# Patient Record
Sex: MALE | Race: WHITE | NOT HISPANIC OR LATINO | Employment: FULL TIME | URBAN - METROPOLITAN AREA
[De-identification: names, ages, dates, MRNs, and addresses within clinical notes are randomized per-mention and may not be internally consistent; named-entity substitution may affect disease eponyms.]

---

## 2017-01-20 ENCOUNTER — TELEPHONE (OUTPATIENT)
Dept: FAMILY MEDICINE | Facility: CLINIC | Age: 32
End: 2017-01-20

## 2017-01-20 DIAGNOSIS — D68.61 ANTI-PHOSPHOLIPID ANTIBODY SYNDROME (H): Primary | ICD-10-CM

## 2017-01-20 DIAGNOSIS — Z79.01 LONG TERM (CURRENT) USE OF ANTICOAGULANTS: ICD-10-CM

## 2017-01-20 RX ORDER — WARFARIN SODIUM 5 MG/1
TABLET ORAL
Qty: 180 TABLET | Refills: 0 | Status: SHIPPED | OUTPATIENT
Start: 2017-01-20 | End: 2017-02-20

## 2017-01-20 NOTE — TELEPHONE ENCOUNTER
Ok to re-enroll in INR clinic. rx refilled. Please have patient follow up with me as well in clinic.    Vahid Rush MD

## 2017-01-20 NOTE — TELEPHONE ENCOUNTER
Patient contacted [facility] to request a refill of Warfarin.  Patient was removed from active [facility] patient list due to non-compliance.  Patient's last INR check was 04/08/2016.  Patient reports that he has been off of his Warfarin for approximately 6 months due to personal stressors in his life.  Last office visit with primary provider over 2 years ago on 10/22/2014.    Advised that the above information would be sent to primary provider to please review and advise.    If provider would like patient to be re-enrolled in INR clinic please place INR Clinic referral and approve pended order for Warfarin.    Please advise on Warfarin dosing (last known Warfarin dose was 10 mg daily) due to patient having not taken Warfarin for the past 6 months.    Routing to provider to please review and advise.  Isaura Tang RN

## 2017-01-20 NOTE — TELEPHONE ENCOUNTER
Spoke with patient to inform of below information.  INR appointment scheduled for 01/27/2017 (1 week from today) at 8:00 AM; schedule office visit with Dr. Rush on 01/27/2017 at 8:20 AM.  Advised patient to resume previous maintenance dose of Warfarin 10 mg daily.    Routing to provider as FYI and to advise with any additional concerns.  Isaura Tang RN

## 2017-01-27 ENCOUNTER — ANTICOAGULATION THERAPY VISIT (OUTPATIENT)
Dept: NURSING | Facility: CLINIC | Age: 32
End: 2017-01-27
Payer: COMMERCIAL

## 2017-01-27 ENCOUNTER — OFFICE VISIT (OUTPATIENT)
Dept: FAMILY MEDICINE | Facility: CLINIC | Age: 32
End: 2017-01-27
Payer: COMMERCIAL

## 2017-01-27 VITALS
HEART RATE: 59 BPM | TEMPERATURE: 97.5 F | OXYGEN SATURATION: 96 % | SYSTOLIC BLOOD PRESSURE: 115 MMHG | BODY MASS INDEX: 34.63 KG/M2 | HEIGHT: 74 IN | DIASTOLIC BLOOD PRESSURE: 68 MMHG | WEIGHT: 269.8 LBS

## 2017-01-27 DIAGNOSIS — I82.409 DVT (DEEP VENOUS THROMBOSIS) (H): ICD-10-CM

## 2017-01-27 DIAGNOSIS — D68.61 ANTI-PHOSPHOLIPID ANTIBODY SYNDROME (H): ICD-10-CM

## 2017-01-27 DIAGNOSIS — D68.61 ANTI-PHOSPHOLIPID ANTIBODY SYNDROME (H): Primary | ICD-10-CM

## 2017-01-27 DIAGNOSIS — Z79.01 LONG-TERM (CURRENT) USE OF ANTICOAGULANTS: ICD-10-CM

## 2017-01-27 DIAGNOSIS — Z79.01 LONG-TERM (CURRENT) USE OF ANTICOAGULANTS: Primary | ICD-10-CM

## 2017-01-27 LAB — INR POINT OF CARE: 1.1 (ref 0.86–1.14)

## 2017-01-27 PROCEDURE — 99213 OFFICE O/P EST LOW 20 MIN: CPT | Performed by: FAMILY MEDICINE

## 2017-01-27 PROCEDURE — 85610 PROTHROMBIN TIME: CPT | Mod: QW

## 2017-01-27 PROCEDURE — 36416 COLLJ CAPILLARY BLOOD SPEC: CPT

## 2017-01-27 PROCEDURE — 99207 ZZC NO CHARGE NURSE ONLY: CPT

## 2017-01-27 ASSESSMENT — PAIN SCALES - GENERAL: PAINLEVEL: NO PAIN (0)

## 2017-01-27 NOTE — MR AVS SNAPSHOT
After Visit Summary   1/27/2017    Jorge Vuong    MRN: [id number]           Patient Information     Date Of Birth          1985        Visit Information        Provider Department      1/27/2017 8:20 AM Vahid Rush MD [facility]        Today's Diagnoses     Anti-phospholipid antibody syndrome (H)    -  1     Long-term (current) use of anticoagulants [Z79.01]           Care Instructions    How to contact your care team providers:    [facility]  [phone]  Pharmacy [phone]    ARON Carpio Dr., Dr., Dr., PA-C    Team RN: Van PRUITT    Clinic hours  M-Th 7am-7pm   Fri 7am-5pm.   Urgent care M-F 11am-9pm,   Sat/sun 9am-5pm.  Pharmacy M-F 8:00am-8pm Sat/sun 9am-5pm.     All password changes, disabled accounts, or ID changes in [employer]/MyHealth will be done by our Access Services Department.   If you need help with your account or password, call: [phone]. Clinic staff no longer has the ability to change passwords.                       Follow-ups after your visit        Your next 10 appointments already scheduled     Feb 01, 2017  8:40 AM   [facility]    [address] [place] [id number] [phone]              Who to contact     If you have questions or need follow up information about today's clinic visit or your schedule please contact [facility] directly at [phone].  Normal or non-critical lab and imaging results will be communicated to you by MyChart, letter or phone within 4 business days after the clinic has received the results. If you do not hear from us within 7 days, please contact the clinic through MyChart or phone. If you have a critical or abnormal lab result, we will notify you by phone as soon as  "possible.  Submit refill requests through [email or web address] or call your pharmacy and they will forward the refill request to us. Please allow 3 business days for your refill to be completed.          Additional Information About Your Visit        [employer] To Media Information     [email or web address] lets you send messages to your doctor, view your test results, renew your prescriptions, schedule appointments and more. To sign up, go to [email or web address] . Click on \"Log in\" on the left side of the screen, which will take you to the Welcome page. Then click on \"Sign up Now\" on the right side of the page.     You will be asked to enter the access code listed below, as well as some personal information. Please follow the directions to create your username and password.     Your access code is: [email or web address]  Expires: 2017  8:28 AM     Your access code will  in 90 days. If you need help or a new code, please call your [facility] clinic or [phone].        Care EveryWhere ID     This is your Care EveryWhere ID. This could be used by other organizations to access your [facility] medical records  [id number]        Your Vitals Were     Pulse Temperature Height BMI (Body Mass Index) Pulse Oximetry       59 97.5  F (36.4  C) (Oral) 6' 2\" (1.88 m) 34.63 kg/m2 96%        Blood Pressure from Last 3 Encounters:   17 115/68   16 134/69   10/22/14 143/78    Weight from Last 3 Encounters:   17 269 lb 12.8 oz (122.38 kg)   16 253 lb 9.6 oz (115.032 kg)   10/22/14 268 lb 3.2 oz (121.655 kg)              Today, you had the following     No orders found for display       Primary Care Provider Office Phone # Fax #    Vahid Rush [phone]       [facility] [address]  [facility] [address]        Thank you!     Thank you for choosing [facility]  for your care. Our goal is always to provide you with excellent care. Hearing back from our patients is one way we can continue " to improve our services. Please take a few minutes to complete the written survey that you may receive in the mail after your visit with us. Thank you!             Your Updated Medication List - Protect others around you: Learn how to safely use, store and throw away your medicines at www.disposemymeds.org.          This list is accurate as of: 1/27/17  8:28 AM.  Always use your most recent med list.                   Brand Name Dispense Instructions for use    warfarin 5 MG tablet    COUMADIN    180 tablet    Take two tablets daily or as directed by [facility] nurse

## 2017-01-27 NOTE — PROGRESS NOTES
ANTICOAGULATION FOLLOW-UP CLINIC VISIT    Patient Name:  Jorge Vuong  Date:  1/27/2017  Contact Type:  Face to Face    SUBJECTIVE:     Patient Findings     Positives Initiation of therapy, Missed doses    Comments Recently restarted Warfarin on 01/20/2017 (please review telephone encounter from 01/20/2017 - patient has not taken Warfarin in approximately six months).  Patient missed Warfarin 10 mg on 01/21/2017 and 01/25/2017.  Recommended that patient get a pill bronson with day of the week on it to help aid in medication compliance.  The soonest patient could return to clinic for INR check was on 0201/2017.           OBJECTIVE    INR PROTIME   Date Value Ref Range Status   01/27/2017 1.1 0.86 - 1.14 Final     FACTOR 2 ASSAY   Date Value Ref Range Status   10/07/2009 25* 60 - 140 % Final       ASSESSMENT / PLAN  INR assessment SUB    Recheck INR In: 5 DAYS    INR Location Clinic      Anticoagulation Summary as of 1/27/2017     INR goal 2.0-3.0   Selected INR 1.1! (1/27/2017)   Maintenance plan 10 mg (5 mg x 2) every day   Full instructions 1/27: 15 mg; Otherwise 10 mg every day   Weekly total 70 mg   Plan last modified Isaura Tang RN (4/8/2016)   Next INR check 2/1/2017   Priority Factor 2   Target end date     Indications   Long-term (current) use of anticoagulants [Z79.01] [Z79.01]  Anti-phospholipid antibody syndrome (H) [D68.61]  DVT (deep venous thrombosis) (H) [I82.409]         Anticoagulation Episode Summary     INR check location     Preferred lab     Send INR reminders to [facility]    Comments       Anticoagulation Care Providers     Provider Role Specialty Phone number    Vahid Rush MD [facility] [phone]            See the Encounter Report to view Anticoagulation Flowsheet and Dosing Calendar (Go to Encounters tab in chart review, and find the Anticoagulation Therapy Visit)    Isaura Tang RN

## 2017-01-27 NOTE — MR AVS SNAPSHOT
Jorge JACOBSON Carolann   1/27/2017 8:00 AM   Anticoagulation Therapy Visit    Description:  31 year old male   Provider:  СЕРГЕЙ JACOBSON   Department:  [facility]           INR as of 1/27/2017     Selected INR 1.1! (1/27/2017)      Anticoagulation Summary as of 1/27/2017     INR goal 2.0-3.0   Selected INR 1.1! (1/27/2017)   Full instructions 1/27: 15 mg; Otherwise 10 mg every day   Next INR check 2/1/2017    Indications   Long-term (current) use of anticoagulants [Z79.01] [Z79.01]  Anti-phospholipid antibody syndrome (H) [D68.61]  DVT (deep venous thrombosis) (H) [I82.409]         Your next Anticoagulation Clinic appointment(s)     Feb 01, 2017  8:40 AM   Anticoagulation Visit with СЕРГЕЙ JACOBSON   [facility]    [address] [place] [id number] [phone]              Contact Numbers     [facility]  Please call  [phone] to cancel and/or reschedule your appointment, or with any problems or questions regarding your therapy.        January 2017 Details    Sun Mon Tue Wed Thu Fri Sat     1               2               3               4               5               6               7                 8               9               10               11               12               13               14                 15               16               17               18               19               20               21                 22               23               24               25               26               27      15 mg   See details      28      10 mg           29      10 mg         30      10 mg         31      10 mg              Date Details   01/27 This INR check               How to take your warfarin dose     To take:  10 mg Take 2 of the 5 mg tablets.    To take:  15 mg Take 3 of the 5 mg tablets.           February 2017 Details    Sun Mon Tue Wed Thu Fri Sat        1            2               3               4                  5               6               7               8               9               10               11                 12               13               14               15               16               17               18                 19               20               21               22               23               24               25                 26               27               28                    Date Details   No additional details    Date of next INR:  2/1/2017         How to take your warfarin dose     To take:  10 mg Take 2 of the 5 mg tablets.

## 2017-01-27 NOTE — NURSING NOTE
"Chief Complaint   Patient presents with     Recheck Medication     follow up coumadin       Initial /68 mmHg  Pulse 59  Temp(Src) 97.5  F (36.4  C) (Oral)  Ht 6' 2\" (1.88 m)  Wt 269 lb 12.8 oz (122.38 kg)  BMI 34.63 kg/m2  SpO2 96% Estimated body mass index is 34.63 kg/(m^2) as calculated from the following:    Height as of this encounter: 6' 2\" (1.88 m).    Weight as of this encounter: 269 lb 12.8 oz (122.38 kg).  BP completed using cuff size: leny Nickerson MA      "

## 2017-01-27 NOTE — PATIENT INSTRUCTIONS
How to contact your care team providers:    [facility]  [phone]  Pharmacy [phone]    ARON Carpio Dr., Dr., Dr., PA-C    Team RN: Van PRUITT    Clinic hours  M-Th 7am-7pm   Fri 7am-5pm.   Urgent care M-F 11am-9pm,   Sat/sun 9am-5pm.  Pharmacy M-F 8:00am-8pm Sat/sun 9am-5pm.     All password changes, disabled accounts, or ID changes in [employer]MyHealth will be done by our Access Services Department.   If you need help with your account or password, call: [phone]. Clinic staff no longer has the ability to change passwords.

## 2017-01-27 NOTE — PROGRESS NOTES
"  SUBJECTIVE:                                                    Jorge Vuong is a 31 year old male who presents to clinic today for the following health issues:      Medication Followup of Coumadin    Taking Medication as prescribed: yes    Side Effects:  None    Medication Helping Symptoms:  yes       Problem list and histories reviewed & adjusted, as indicated.  Additional history: as documented    Problem list, Medication list, Allergies, and Medical/Social/Surgical histories reviewed in EPIC and updated as appropriate.    ROS:  Constitutional, HEENT, cardiovascular, pulmonary, GI, , musculoskeletal, neuro, skin, endocrine and psych systems are negative, except as otherwise noted.    OBJECTIVE:                                                    /68 mmHg  Pulse 59  Temp(Src) 97.5  F (36.4  C) (Oral)  Ht 6' 2\" (1.88 m)  Wt 269 lb 12.8 oz (122.38 kg)  BMI 34.63 kg/m2  SpO2 96%  Body mass index is 34.63 kg/(m^2).  GENERAL: healthy, alert and no distress  NECK: no adenopathy, no asymmetry, masses, or scars and thyroid normal to palpation  RESP: lungs clear to auscultation - no rales, rhonchi or wheezes  CV: regular rate and rhythm, normal S1 S2, no S3 or S4, no murmur, click or rub, no peripheral edema and peripheral pulses strong  ABDOMEN: soft, nontender, no hepatosplenomegaly, no masses and bowel sounds normal  MS: no gross musculoskeletal defects noted, no edema    Diagnostic Test Results:  none      ASSESSMENT/PLAN:                                                      1. Anti-phospholipid antibody syndrome (H)  Will need anticoagulation indefinitely. Discuss importance of compliance to decrease risk for thrombosis or bleeding. Patient understands and agrees with plan.    2. Long-term (current) use of anticoagulants [Z79.01]  As above.      See Patient Instructions    Vahid Rush MD, MD  [facility]    "

## 2017-02-01 ENCOUNTER — ANTICOAGULATION THERAPY VISIT (OUTPATIENT)
Dept: NURSING | Facility: CLINIC | Age: 32
End: 2017-02-01
Payer: COMMERCIAL

## 2017-02-01 DIAGNOSIS — Z79.01 LONG-TERM (CURRENT) USE OF ANTICOAGULANTS: Primary | ICD-10-CM

## 2017-02-01 DIAGNOSIS — D68.61 ANTI-PHOSPHOLIPID ANTIBODY SYNDROME (H): ICD-10-CM

## 2017-02-01 DIAGNOSIS — I82.409 DVT (DEEP VENOUS THROMBOSIS) (H): ICD-10-CM

## 2017-02-01 LAB — INR POINT OF CARE: 2.3 (ref 0.86–1.14)

## 2017-02-01 PROCEDURE — 36416 COLLJ CAPILLARY BLOOD SPEC: CPT

## 2017-02-01 PROCEDURE — 85610 PROTHROMBIN TIME: CPT | Mod: QW

## 2017-02-01 PROCEDURE — 99207 ZZC NO CHARGE NURSE ONLY: CPT

## 2017-02-01 NOTE — MR AVS SNAPSHOT
Jorge A Carolann   2/1/2017 8:40 AM   Anticoagulation Therapy Visit    Description:  31 year old male   Provider:  СЕРГЕЙ JACOBSON   Department:  [facility]           INR as of 2/1/2017     Selected INR 2.3 (2/1/2017)      Anticoagulation Summary as of 2/1/2017     INR goal 2.0-3.0   Selected INR 2.3 (2/1/2017)   Full instructions 10 mg every day   Next INR check 2/6/2017    Indications   Long-term (current) use of anticoagulants [Z79.01] [Z79.01]  Anti-phospholipid antibody syndrome (H) [D68.61]  DVT (deep venous thrombosis) (H) [I82.409]         Your next Anticoagulation Clinic appointment(s)     Feb 06, 2017  9:00 AM   Anticoagulation Visit with СЕРГЕЙ JACOBSON   [facility] ([facility])    [address] [place] [id number] [phone]              Contact Numbers     [facility]  Please call  [phone] to cancel and/or reschedule your appointment, or with any problems or questions regarding your therapy.        February 2017 Details    Sun Mon Tue Wed Thu Fri Sat        1      10 mg   See details      2      10 mg         3      10 mg         4      10 mg           5      10 mg         6            7               8               9               10               11                 12               13               14               15               16               17               18                 19               20               21               22               23               24               25                 26               27               28                    Date Details   02/01 This INR check       Date of next INR:  2/6/2017         How to take your warfarin dose     To take:  10 mg Take 2 of the 5 mg tablets.

## 2017-02-01 NOTE — PROGRESS NOTES
ANTICOAGULATION FOLLOW-UP CLINIC VISIT    Patient Name:  Jorge Vuong  Date:  2/1/2017  Contact Type:  Face to Face    SUBJECTIVE:     Patient Findings     Positives No Problem Findings           OBJECTIVE    INR PROTIME   Date Value Ref Range Status   02/01/2017 2.3* 0.86 - 1.14 Final     FACTOR 2 ASSAY   Date Value Ref Range Status   10/07/2009 25* 60 - 140 % Final       ASSESSMENT / PLAN  INR assessment THER    Recheck INR In: 5 DAYS    INR Location Clinic      Anticoagulation Summary as of 2/1/2017     INR goal 2.0-3.0   Selected INR 2.3 (2/1/2017)   Maintenance plan 10 mg (5 mg x 2) every day   Full instructions 10 mg every day   Weekly total 70 mg   No change documented Isaura Tang RN   Plan last modified Isaura Tang RN (4/8/2016)   Next INR check 2/6/2017   Priority Factor 2   Target end date     Indications   Long-term (current) use of anticoagulants [Z79.01] [Z79.01]  Anti-phospholipid antibody syndrome (H) [D68.61]  DVT (deep venous thrombosis) (H) [I82.409]         Anticoagulation Episode Summary     INR check location     Preferred lab     Send INR reminders to  [facility]    Comments       Anticoagulation Care Providers     Provider Role Specialty Phone number    Vahid Rush MD [facility] [phone]            See the Encounter Report to view Anticoagulation Flowsheet and Dosing Calendar (Go to Encounters tab in chart review, and find the Anticoagulation Therapy Visit)    Isaura Tang RN

## 2017-02-06 ENCOUNTER — ANTICOAGULATION THERAPY VISIT (OUTPATIENT)
Dept: NURSING | Facility: CLINIC | Age: 32
End: 2017-02-06
Payer: COMMERCIAL

## 2017-02-06 DIAGNOSIS — D68.61 ANTI-PHOSPHOLIPID ANTIBODY SYNDROME (H): ICD-10-CM

## 2017-02-06 DIAGNOSIS — Z79.01 LONG-TERM (CURRENT) USE OF ANTICOAGULANTS: Primary | ICD-10-CM

## 2017-02-06 DIAGNOSIS — I82.409 DVT (DEEP VENOUS THROMBOSIS) (H): ICD-10-CM

## 2017-02-06 LAB — INR POINT OF CARE: 3 (ref 0.86–1.14)

## 2017-02-06 PROCEDURE — 36416 COLLJ CAPILLARY BLOOD SPEC: CPT

## 2017-02-06 PROCEDURE — 85610 PROTHROMBIN TIME: CPT | Mod: QW

## 2017-02-06 PROCEDURE — 99207 ZZC NO CHARGE NURSE ONLY: CPT

## 2017-02-06 NOTE — MR AVS SNAPSHOT
Jorge JACOBSON Carolann   2/6/2017 9:00 AM   Anticoagulation Therapy Visit    Description:  31 year old male   Provider:  СЕРГЕЙ JACOBSON   Department:  [facility]           INR as of 2/6/2017     Selected INR 3.0 (2/6/2017)      Anticoagulation Summary as of 2/6/2017     INR goal 2.0-3.0   Selected INR 3.0 (2/6/2017)   Full instructions 10 mg every day   Next INR check 2/13/2017    Indications   Long-term (current) use of anticoagulants [Z79.01] [Z79.01]  Anti-phospholipid antibody syndrome (H) [D68.61]  DVT (deep venous thrombosis) (H) [I82.409]         Your next Anticoagulation Clinic appointment(s)     Feb 06, 2017  9:00 AM   Anticoagulation Visit with СЕРГЕЙ JACOBSON   [facility]    [address] [facility] [id number] [phone]            Feb 13, 2017  1:40 PM   Anticoagulation Visit with СЕРГЕЙ JACOBSON   [facility] ([facility]    [address] [facility] [address]   [phone]              Contact Numbers     [facility]  Please call  [phone] to cancel and/or reschedule your appointment, or with any problems or questions regarding your therapy.        February 2017 Details    Sun Mon Tue Wed Thu Fri Sat        1               2               3               4                 5               6      10 mg   See details      7      10 mg         8      10 mg         9      10 mg         10      10 mg         11      10 mg           12      10 mg         13            14               15               16               17               18                 19               20               21               22               23               24               25                 26               27               28                    Date Details   02/06 This INR check       Date of next INR:  2/13/2017         How to take your warfarin dose     To take:  10 mg Take 2 of the 5 mg tablets.

## 2017-02-06 NOTE — PROGRESS NOTES
ANTICOAGULATION FOLLOW-UP CLINIC VISIT    Patient Name:  Jorge Vuong  Date:  2/6/2017  Contact Type:  Face to Face    SUBJECTIVE:     Patient Findings     Positives No Problem Findings           OBJECTIVE    INR PROTIME   Date Value Ref Range Status   02/06/2017 3.0* 0.86 - 1.14 Final     FACTOR 2 ASSAY   Date Value Ref Range Status   10/07/2009 25* 60 - 140 % Final       ASSESSMENT / PLAN  INR assessment THER    Recheck INR In: 1 WEEK    INR Location Clinic      Anticoagulation Summary as of 2/6/2017     INR goal 2.0-3.0   Selected INR 3.0 (2/6/2017)   Maintenance plan 10 mg (5 mg x 2) every day   Full instructions 10 mg every day   Weekly total 70 mg   No change documented Isaura Tang RN   Plan last modified Isaura Tang RN (4/8/2016)   Next INR check 2/13/2017   Priority Factor 2   Target end date     Indications   Long-term (current) use of anticoagulants [Z79.01] [Z79.01]  Anti-phospholipid antibody syndrome (H) [D68.61]  DVT (deep venous thrombosis) (H) [I82.409]         Anticoagulation Episode Summary     INR check location     Preferred lab     Send INR reminders to  [facility]    Comments       Anticoagulation Care Providers     Provider Role Specialty Phone number    Vahid Rush MD [facility] [phone]            See the Encounter Report to view Anticoagulation Flowsheet and Dosing Calendar (Go to Encounters tab in chart review, and find the Anticoagulation Therapy Visit)    Isaura Tang RN

## 2017-02-13 ENCOUNTER — ANTICOAGULATION THERAPY VISIT (OUTPATIENT)
Dept: NURSING | Facility: CLINIC | Age: 32
End: 2017-02-13
Payer: COMMERCIAL

## 2017-02-13 DIAGNOSIS — D68.61 ANTI-PHOSPHOLIPID ANTIBODY SYNDROME (H): ICD-10-CM

## 2017-02-13 DIAGNOSIS — I82.409 DVT (DEEP VENOUS THROMBOSIS) (H): ICD-10-CM

## 2017-02-13 DIAGNOSIS — Z79.01 LONG-TERM (CURRENT) USE OF ANTICOAGULANTS: ICD-10-CM

## 2017-02-13 LAB — INR POINT OF CARE: 3.5 (ref 0.86–1.14)

## 2017-02-13 PROCEDURE — 85610 PROTHROMBIN TIME: CPT | Mod: QW

## 2017-02-13 PROCEDURE — 36416 COLLJ CAPILLARY BLOOD SPEC: CPT

## 2017-02-13 PROCEDURE — 99207 ZZC NO CHARGE NURSE ONLY: CPT

## 2017-02-13 NOTE — MR AVS SNAPSHOT
Jorge Lynayana   2/13/2017 1:40 PM   Anticoagulation Therapy Visit    Description:  31 year old male   Provider:  СЕРГЕЙ JACOBSON   Department:  [facility]           INR as of 2/13/2017     Today's INR 3.5!      Anticoagulation Summary as of 2/13/2017     INR goal 2.0-3.0   Today's INR 3.5!   Full instructions 2/13: 5 mg; Otherwise 10 mg every day   Next INR check 2/20/2017    Indications   Long-term (current) use of anticoagulants [Z79.01] [Z79.01]  Anti-phospholipid antibody syndrome (H) [D68.61]  DVT (deep venous thrombosis) (H) [I82.409]         Your next Anticoagulation Clinic appointment(s)     Feb 20, 2017  9:00 AM CST   Anticoagulation Visit with СЕРГЕЙ JACOBSON   [facility]    [address] [place] [id number] [phone]              Contact Numbers     [facility]  Please call  [phone] to cancel and/or reschedule your appointment, or with any problems or questions regarding your therapy.        February 2017 Details    Sun Mon Tue Wed Thu Fri Sat        1               2               3               4                 5               6               7               8               9               10               11                 12               13      5 mg   See details      14      10 mg         15      10 mg         16      10 mg         17      10 mg         18      10 mg           19      10 mg         20            21               22               23               24               25                 26               27               28                    Date Details   02/13 This INR check       Date of next INR:  2/20/2017         How to take your warfarin dose     To take:  5 mg Take 1 of the 5 mg tablets.    To take:  10 mg Take 2 of the 5 mg tablets.

## 2017-02-13 NOTE — PROGRESS NOTES
ANTICOAGULATION FOLLOW-UP CLINIC VISIT    Patient Name:  Jorge Vuong  Date:  2/13/2017  Contact Type:  Face to Face    SUBJECTIVE:     Patient Findings     Positives No Problem Findings    Comments Patient does not eat as many green vegetables as he used to when he took Warfarin several months ago (back when maintenance dose was 70 mg of Warfarin per week).           OBJECTIVE    INR Protime   Date Value Ref Range Status   02/13/2017 3.5 (A) 0.86 - 1.14 Final     Factor 2 Assay   Date Value Ref Range Status   10/07/2009 25 (L) 60 - 140 % Final       ASSESSMENT / PLAN  INR assessment SUPRA    Recheck INR In: 1 WEEK    INR Location Clinic      Anticoagulation Summary as of 2/13/2017     INR goal 2.0-3.0   Today's INR 3.5!   Maintenance plan 10 mg (5 mg x 2) every day   Full instructions 2/13: 5 mg; Otherwise 10 mg every day   Weekly total 70 mg   Plan last modified Isaura Tang RN (4/8/2016)   Next INR check 2/20/2017   Priority Factor 2   Target end date     Indications   Long-term (current) use of anticoagulants [Z79.01] [Z79.01]  Anti-phospholipid antibody syndrome (H) [D68.61]  DVT (deep venous thrombosis) (H) [I82.409]         Anticoagulation Episode Summary     INR check location     Preferred lab     Send INR reminders to [facility]    Comments       Anticoagulation Care Providers     Provider Role Specialty Phone number    Vahid Rush MD [facility] [phone]            See the Encounter Report to view Anticoagulation Flowsheet and Dosing Calendar (Go to Encounters tab in chart review, and find the Anticoagulation Therapy Visit)    Isaura Tang RN

## 2017-02-20 ENCOUNTER — ANTICOAGULATION THERAPY VISIT (OUTPATIENT)
Dept: NURSING | Facility: CLINIC | Age: 32
End: 2017-02-20
Payer: COMMERCIAL

## 2017-02-20 DIAGNOSIS — I82.409 DVT (DEEP VENOUS THROMBOSIS) (H): ICD-10-CM

## 2017-02-20 DIAGNOSIS — D68.61 ANTI-PHOSPHOLIPID ANTIBODY SYNDROME (H): ICD-10-CM

## 2017-02-20 DIAGNOSIS — Z79.01 LONG TERM (CURRENT) USE OF ANTICOAGULANTS: ICD-10-CM

## 2017-02-20 DIAGNOSIS — Z79.01 LONG-TERM (CURRENT) USE OF ANTICOAGULANTS: ICD-10-CM

## 2017-02-20 LAB — INR POINT OF CARE: 1.8 (ref 0.86–1.14)

## 2017-02-20 PROCEDURE — 85610 PROTHROMBIN TIME: CPT | Mod: QW

## 2017-02-20 PROCEDURE — 99207 ZZC NO CHARGE NURSE ONLY: CPT

## 2017-02-20 PROCEDURE — 36416 COLLJ CAPILLARY BLOOD SPEC: CPT

## 2017-02-20 RX ORDER — WARFARIN SODIUM 5 MG/1
TABLET ORAL
Qty: 180 TABLET | Refills: 0 | COMMUNITY
Start: 2017-02-20 | End: 2017-04-03

## 2017-02-20 NOTE — MR AVS SNAPSHOT
Jorge JACOBSON Carolann   2/20/2017 9:00 AM   Anticoagulation Therapy Visit    Description:  31 year old male   Provider:  СЕРГЕЙ JACOBSON   Department:  [facility]           INR as of 2/20/2017     Today's INR 1.8!      Anticoagulation Summary as of 2/20/2017     INR goal 2.0-3.0   Today's INR 1.8!   Full instructions 7.5 mg on Mon, Fri; 10 mg all other days   Next INR check 2/27/2017    Indications   Long-term (current) use of anticoagulants [Z79.01] [Z79.01]  Anti-phospholipid antibody syndrome (H) [D68.61]  DVT (deep venous thrombosis) (H) [I82.409]         Your next Anticoagulation Clinic appointment(s)     Feb 20, 2017  9:00 AM CST   Anticoagulation Visit with СЕРГЕЙ JACOBSON   [facility] ([facility])    [address] [place] [id number] [phone]              Contact Numbers     [facility]  Please call  [phone] to cancel and/or reschedule your appointment, or with any problems or questions regarding your therapy.        February 2017 Details    Sun Mon Tue Wed Thu Fri Sat        1               2               3               4                 5               6               7               8               9               10               11                 12               13               14               15               16               17               18                 19               20      7.5 mg   See details      21      10 mg         22      10 mg         23      10 mg         24      7.5 mg         25      10 mg           26      10 mg         27            28                    Date Details   02/20 This INR check       Date of next INR:  2/27/2017         How to take your warfarin dose     To take:  7.5 mg Take 1.5 of the 5 mg tablets.    To take:  10 mg Take 2 of the 5 mg tablets.

## 2017-02-20 NOTE — PROGRESS NOTES
ANTICOAGULATION FOLLOW-UP CLINIC VISIT    Patient Name:  Jorge Vuong  Date:  2/20/2017  Contact Type:  Face to Face    SUBJECTIVE:     Patient Findings     Positives Missed doses    Comments Patient missed 10 mg dose of Warfarin on 02/15/2017.           OBJECTIVE    INR Protime   Date Value Ref Range Status   02/20/2017 1.8 (A) 0.86 - 1.14 Final     Factor 2 Assay   Date Value Ref Range Status   10/07/2009 25 (L) 60 - 140 % Final       ASSESSMENT / PLAN  INR assessment SUB    Recheck INR In: 1 WEEK    INR Location Clinic      Anticoagulation Summary as of 2/20/2017     INR goal 2.0-3.0   Today's INR 1.8!   Maintenance plan 7.5 mg (5 mg x 1.5) on Mon, Fri; 10 mg (5 mg x 2) all other days   Full instructions 7.5 mg on Mon, Fri; 10 mg all other days   Weekly total 65 mg   Plan last modified Isaura Tang RN (2/20/2017)   Next INR check 2/27/2017   Priority Factor 2   Target end date     Indications   Long-term (current) use of anticoagulants [Z79.01] [Z79.01]  Anti-phospholipid antibody syndrome (H) [D68.61]  DVT (deep venous thrombosis) (H) [I82.409]         Anticoagulation Episode Summary     INR check location     Preferred lab     Send INR reminders to [facility]    Comments       Anticoagulation Care Providers     Provider Role Specialty Phone number    Vahid Rush MD [facility] [phone]            See the Encounter Report to view Anticoagulation Flowsheet and Dosing Calendar (Go to Encounters tab in chart review, and find the Anticoagulation Therapy Visit)    Isaura Tang RN

## 2017-02-27 ENCOUNTER — ANTICOAGULATION THERAPY VISIT (OUTPATIENT)
Dept: NURSING | Facility: CLINIC | Age: 32
End: 2017-02-27
Payer: COMMERCIAL

## 2017-02-27 DIAGNOSIS — Z79.01 LONG-TERM (CURRENT) USE OF ANTICOAGULANTS: ICD-10-CM

## 2017-02-27 DIAGNOSIS — D68.61 ANTI-PHOSPHOLIPID ANTIBODY SYNDROME (H): ICD-10-CM

## 2017-02-27 DIAGNOSIS — I82.409 DVT (DEEP VENOUS THROMBOSIS) (H): ICD-10-CM

## 2017-02-27 LAB — INR POINT OF CARE: 2.4 (ref 0.86–1.14)

## 2017-02-27 PROCEDURE — 85610 PROTHROMBIN TIME: CPT | Mod: QW

## 2017-02-27 PROCEDURE — 99207 ZZC NO CHARGE NURSE ONLY: CPT

## 2017-02-27 PROCEDURE — 36416 COLLJ CAPILLARY BLOOD SPEC: CPT

## 2017-02-27 NOTE — PROGRESS NOTES
ANTICOAGULATION FOLLOW-UP CLINIC VISIT    Patient Name:  Joreg Vuong  Date:  2/27/2017  Contact Type:  Face to Face    SUBJECTIVE:     Patient Findings     Positives No Problem Findings           OBJECTIVE    INR Protime   Date Value Ref Range Status   02/27/2017 2.4 (A) 0.86 - 1.14 Final     Factor 2 Assay   Date Value Ref Range Status   10/07/2009 25 (L) 60 - 140 % Final       ASSESSMENT / PLAN  INR assessment THER    Recheck INR In: 2 WEEKS    INR Location Clinic      Anticoagulation Summary as of 2/27/2017     INR goal 2.0-3.0   Today's INR 2.4   Maintenance plan 7.5 mg (5 mg x 1.5) on Mon, Fri; 10 mg (5 mg x 2) all other days   Full instructions 7.5 mg on Mon, Fri; 10 mg all other days   Weekly total 65 mg   No change documented Isaura Tang RN   Plan last modified Isaura Tang RN (2/20/2017)   Next INR check 3/13/2017   Priority Factor 2   Target end date     Indications   Long-term (current) use of anticoagulants [Z79.01] [Z79.01]  Anti-phospholipid antibody syndrome (H) [D68.61]  DVT (deep venous thrombosis) (H) [I82.409]         Anticoagulation Episode Summary     INR check location     Preferred lab     Send INR reminders to [facility]    Comments       Anticoagulation Care Providers     Provider Role Specialty Phone number    Vahid Rush MD [facility] [phone]            See the Encounter Report to view Anticoagulation Flowsheet and Dosing Calendar (Go to Encounters tab in chart review, and find the Anticoagulation Therapy Visit)    Isaura Tang RN

## 2017-02-27 NOTE — MR AVS SNAPSHOT
Jorge Lynayana   2/27/2017 8:40 AM   Anticoagulation Therapy Visit    Description:  31 year old male   Provider:  СЕРГЕЙ JACOBSON   Department:  [facility]           INR as of 2/27/2017     Today's INR 2.4      Anticoagulation Summary as of 2/27/2017     INR goal 2.0-3.0   Today's INR 2.4   Full instructions 7.5 mg on Mon, Fri; 10 mg all other days   Next INR check 3/13/2017    Indications   Long-term (current) use of anticoagulants [Z79.01] [Z79.01]  Anti-phospholipid antibody syndrome (H) [D68.61]  DVT (deep venous thrombosis) (H) [I82.409]         Your next Anticoagulation Clinic appointment(s)     Feb 27, 2017  8:40 AM CST   Anticoagulation Visit with СЕРГЕЙ JACOBSON   [facility] ([facility])    [address] [facility] [address]   [phone]            Mar 13, 2017  9:20 AM CDT   Anticoagulation Visit with СЕРГЕЙ JACOBSON   [facility] ([facility])    [address] [facility] [address]   [phone]              Contact Numbers     [facility]  Please call  [phone] to cancel and/or reschedule your appointment, or with any problems or questions regarding your therapy.        February 2017 Details    Sun Mon Tue Wed Thu Fri Sat        1               2               3               4                 5               6               7               8               9               10               11                 12               13               14               15               16               17               18                 19               20               21               22               23               24               25                 26               27      7.5 mg   See details      28      10 mg              Date Details   02/27 This INR check               How to take your warfarin dose     To take:  7.5 mg Take 1.5 of the 5 mg tablets.    To take:  10 mg Take 2 of the 5 mg  tablets.           March 2017 Details    Sun Mon Tue Wed Thu Fri Sat        1      10 mg         2      10 mg         3      7.5 mg         4      10 mg           5      10 mg         6      7.5 mg         7      10 mg         8      10 mg         9      10 mg         10      7.5 mg         11      10 mg           12      10 mg         13            14               15               16               17               18                 19               20               21               22               23               24               25                 26               27               28               29               30               31                 Date Details   No additional details    Date of next INR:  3/13/2017         How to take your warfarin dose     To take:  7.5 mg Take 1.5 of the 5 mg tablets.    To take:  10 mg Take 2 of the 5 mg tablets.

## 2017-03-13 ENCOUNTER — ANTICOAGULATION THERAPY VISIT (OUTPATIENT)
Dept: NURSING | Facility: CLINIC | Age: 32
End: 2017-03-13
Payer: COMMERCIAL

## 2017-03-13 DIAGNOSIS — Z79.01 LONG-TERM (CURRENT) USE OF ANTICOAGULANTS: ICD-10-CM

## 2017-03-13 DIAGNOSIS — I82.409 DVT (DEEP VENOUS THROMBOSIS) (H): ICD-10-CM

## 2017-03-13 DIAGNOSIS — D68.61 ANTI-PHOSPHOLIPID ANTIBODY SYNDROME (H): ICD-10-CM

## 2017-03-13 LAB — INR POINT OF CARE: 1.5 (ref 0.86–1.14)

## 2017-03-13 PROCEDURE — 99207 ZZC NO CHARGE NURSE ONLY: CPT

## 2017-03-13 PROCEDURE — 85610 PROTHROMBIN TIME: CPT | Mod: QW

## 2017-03-13 PROCEDURE — 36416 COLLJ CAPILLARY BLOOD SPEC: CPT

## 2017-03-13 NOTE — MR AVS SNAPSHOT
Jorge JACOBSON Carolann   3/13/2017 9:20 AM   Anticoagulation Therapy Visit    Description:  31 year old male   Provider:  СЕРГЕЙ JACOBSON   Department:  [facility]           INR as of 3/13/2017     Today's INR 1.5!      Anticoagulation Summary as of 3/13/2017     INR goal 2.0-3.0   Today's INR 1.5!   Full instructions 3/13: 10 mg; Otherwise 7.5 mg on Mon, Fri; 10 mg all other days   Next INR check 3/20/2017    Indications   Long-term (current) use of anticoagulants [Z79.01] [Z79.01]  Anti-phospholipid antibody syndrome (H) [D68.61]  DVT (deep venous thrombosis) (H) [I82.409]         Your next Anticoagulation Clinic appointment(s)     Mar 20, 2017  8:40 AM CDT   Anticoagulation Visit with СЕРГЕЙ JACOBSON   [facility]    [address] [place] [id number] [phone]              Contact Numbers     [facility]  Please call  [phone] to cancel and/or reschedule your appointment, or with any problems or questions regarding your therapy.        March 2017 Details    Sun Mon Tue Wed Thu Fri Sat        1               2               3               4                 5               6               7               8               9               10               11                 12               13      10 mg   See details      14      10 mg         15      10 mg         16      10 mg         17      7.5 mg         18      10 mg           19      10 mg         20            21               22               23               24               25                 26               27               28               29               30               31                 Date Details   03/13 This INR check       Date of next INR:  3/20/2017         How to take your warfarin dose     To take:  7.5 mg Take 1.5 of the 5 mg tablets.    To take:  10 mg Take 2 of the 5 mg tablets.

## 2017-03-13 NOTE — PROGRESS NOTES
ANTICOAGULATION FOLLOW-UP CLINIC VISIT    Patient Name:  Jorge Vuong  Date:  3/13/2017  Contact Type:  Face to Face    SUBJECTIVE:     Patient Findings     Positives Missed doses    Comments Patient is unsure if he missed a dose of Warfarin in the past 7 days.  Per patient report, potentially missed dose on Friday (03/10/2017).           OBJECTIVE    INR Protime   Date Value Ref Range Status   03/13/2017 1.5 (A) 0.86 - 1.14 Final     Factor 2 Assay   Date Value Ref Range Status   10/07/2009 25 (L) 60 - 140 % Final       ASSESSMENT / PLAN  INR assessment SUB    Recheck INR In: 1 WEEK    INR Location Clinic      Anticoagulation Summary as of 3/13/2017     INR goal 2.0-3.0   Today's INR 1.5!   Maintenance plan 7.5 mg (5 mg x 1.5) on Mon, Fri; 10 mg (5 mg x 2) all other days   Full instructions 3/13: 10 mg; Otherwise 7.5 mg on Mon, Fri; 10 mg all other days   Weekly total 65 mg   Plan last modified Isaura Tang RN (2/20/2017)   Next INR check 3/20/2017   Priority Factor 2   Target end date     Indications   Long-term (current) use of anticoagulants [Z79.01] [Z79.01]  Anti-phospholipid antibody syndrome (H) [D68.61]  DVT (deep venous thrombosis) (H) [I82.409]         Anticoagulation Episode Summary     INR check location     Preferred lab     Send INR reminders to [facility]    Comments       Anticoagulation Care Providers     Provider Role Specialty Phone number    Vahid Rush MD [facility] [phone]            See the Encounter Report to view Anticoagulation Flowsheet and Dosing Calendar (Go to Encounters tab in chart review, and find the Anticoagulation Therapy Visit)    Isaura Tang RN

## 2017-03-22 ENCOUNTER — ANTICOAGULATION THERAPY VISIT (OUTPATIENT)
Dept: NURSING | Facility: CLINIC | Age: 32
End: 2017-03-22
Payer: COMMERCIAL

## 2017-03-22 DIAGNOSIS — Z79.01 LONG-TERM (CURRENT) USE OF ANTICOAGULANTS: ICD-10-CM

## 2017-03-22 DIAGNOSIS — I82.409 DVT (DEEP VENOUS THROMBOSIS) (H): ICD-10-CM

## 2017-03-22 DIAGNOSIS — D68.61 ANTI-PHOSPHOLIPID ANTIBODY SYNDROME (H): ICD-10-CM

## 2017-03-22 LAB — INR POINT OF CARE: 2.2 (ref 0.86–1.14)

## 2017-03-22 PROCEDURE — 99207 ZZC NO CHARGE NURSE ONLY: CPT

## 2017-03-22 PROCEDURE — 85610 PROTHROMBIN TIME: CPT | Mod: QW

## 2017-03-22 PROCEDURE — 36416 COLLJ CAPILLARY BLOOD SPEC: CPT

## 2017-03-22 NOTE — PROGRESS NOTES
ANTICOAGULATION FOLLOW-UP CLINIC VISIT    Patient Name:  Jorge Vuong  Date:  3/22/2017  Contact Type:  Face to Face    SUBJECTIVE:     Patient Findings     Positives No Problem Findings    Comments Patient took Warfarin 10 mg on Friday instead of recommended 7.5 mg.             OBJECTIVE    INR Protime   Date Value Ref Range Status   03/22/2017 2.2 (A) 0.86 - 1.14 Final     Factor 2 Assay   Date Value Ref Range Status   10/07/2009 25 (L) 60 - 140 % Final       ASSESSMENT / PLAN  INR assessment THER    Recheck INR In: 10 DAYS    INR Location Clinic      Anticoagulation Summary as of 3/22/2017     INR goal 2.0-3.0   Today's INR 2.2   Maintenance plan 7.5 mg (5 mg x 1.5) on Mon; 10 mg (5 mg x 2) all other days   Full instructions 7.5 mg on Mon; 10 mg all other days   Weekly total 67.5 mg   Plan last modified Isaura Tang RN (3/22/2017)   Next INR check 4/3/2017   Priority Factor 2   Target end date     Indications   Long-term (current) use of anticoagulants [Z79.01] [Z79.01]  Anti-phospholipid antibody syndrome (H) [D68.61]  DVT (deep venous thrombosis) (H) [I82.409]         Anticoagulation Episode Summary     INR check location     Preferred lab     Send INR reminders to [facility]    Comments       Anticoagulation Care Providers     Provider Role Specialty Phone number    Vaihd Rush MD [facility] [phone]            See the Encounter Report to view Anticoagulation Flowsheet and Dosing Calendar (Go to Encounters tab in chart review, and find the Anticoagulation Therapy Visit)    Isaura Tang RN

## 2017-03-22 NOTE — MR AVS SNAPSHOT
Jorge Lynayana   3/22/2017 4:00 PM   Anticoagulation Therapy Visit    Description:  31 year old male   Provider:  СЕРГЕЙ JACOBSON   Department:  [facility]           INR as of 3/22/2017     Today's INR 2.2      Anticoagulation Summary as of 3/22/2017     INR goal 2.0-3.0   Today's INR 2.2   Full instructions 7.5 mg on Mon; 10 mg all other days   Next INR check 4/3/2017    Indications   Long-term (current) use of anticoagulants [Z79.01] [Z79.01]  Anti-phospholipid antibody syndrome (H) [D68.61]  DVT (deep venous thrombosis) (H) [I82.409]         Your next Anticoagulation Clinic appointment(s)     Apr 03, 2017  9:20 AM CDT   Anticoagulation Visit with СЕРГЕЙ JACOBSON   [facility]    [address] [place] [id number] [phone]              Contact Numbers     [facility]  Please call  [phone] to cancel and/or reschedule your appointment, or with any problems or questions regarding your therapy.        March 2017 Details    Sun Mon Tue Wed Thu Fri Sat        1               2               3               4                 5               6               7               8               9               10               11                 12               13               14               15               16               17               18                 19               20               21               22      10 mg   See details      23      10 mg         24      10 mg         25      10 mg           26      10 mg         27      7.5 mg         28      10 mg         29      10 mg         30      10 mg         31      10 mg           Date Details   03/22 This INR check               How to take your warfarin dose     To take:  7.5 mg Take 1.5 of the 5 mg tablets.    To take:  10 mg Take 2 of the 5 mg tablets.           April 2017 Details    Sun Mon Tue Wed Thu Fri Sat           1      10 mg           2      10 mg         3             4               5               6               7               8                 9               10               11               12               13               14               15                 16               17               18               19               20               21               22                 23               24               25               26               27               28               29                 30                      Date Details   No additional details    Date of next INR:  4/3/2017         How to take your warfarin dose     To take:  7.5 mg Take 1.5 of the 5 mg tablets.    To take:  10 mg Take 2 of the 5 mg tablets.

## 2017-04-03 ENCOUNTER — ANTICOAGULATION THERAPY VISIT (OUTPATIENT)
Dept: NURSING | Facility: CLINIC | Age: 32
End: 2017-04-03
Payer: COMMERCIAL

## 2017-04-03 DIAGNOSIS — Z79.01 LONG TERM (CURRENT) USE OF ANTICOAGULANTS: ICD-10-CM

## 2017-04-03 DIAGNOSIS — Z79.01 LONG-TERM (CURRENT) USE OF ANTICOAGULANTS: ICD-10-CM

## 2017-04-03 DIAGNOSIS — I82.409 DVT (DEEP VENOUS THROMBOSIS) (H): ICD-10-CM

## 2017-04-03 DIAGNOSIS — D68.61 ANTI-PHOSPHOLIPID ANTIBODY SYNDROME (H): ICD-10-CM

## 2017-04-03 LAB — INR POINT OF CARE: 2.3 (ref 0.86–1.14)

## 2017-04-03 PROCEDURE — 85610 PROTHROMBIN TIME: CPT | Mod: QW

## 2017-04-03 PROCEDURE — 36416 COLLJ CAPILLARY BLOOD SPEC: CPT

## 2017-04-03 PROCEDURE — 99207 ZZC NO CHARGE NURSE ONLY: CPT

## 2017-04-03 RX ORDER — WARFARIN SODIUM 5 MG/1
TABLET ORAL
Qty: 180 TABLET | Refills: 0 | COMMUNITY
Start: 2017-04-03 | End: 2017-11-15

## 2017-04-03 NOTE — PROGRESS NOTES
ANTICOAGULATION FOLLOW-UP CLINIC VISIT    Patient Name:  Jorge Vuong  Date:  4/3/2017  Contact Type:  Face to Face    SUBJECTIVE:     Patient Findings     Positives No Problem Findings           OBJECTIVE    INR Protime   Date Value Ref Range Status   04/03/2017 2.3 (A) 0.86 - 1.14 Final     Factor 2 Assay   Date Value Ref Range Status   10/07/2009 25 (L) 60 - 140 % Final       ASSESSMENT / PLAN  INR assessment THER    Recheck INR In: 3 WEEKS    INR Location Clinic      Anticoagulation Summary as of 4/3/2017     INR goal 2.0-3.0   Today's INR 2.3   Maintenance plan 7.5 mg (5 mg x 1.5) on Mon; 10 mg (5 mg x 2) all other days   Full instructions 7.5 mg on Mon; 10 mg all other days   Weekly total 67.5 mg   No change documented Isaura Tang RN   Plan last modified Isaura Tang RN (3/22/2017)   Next INR check 4/24/2017   Priority Factor 2   Target end date     Indications   Long-term (current) use of anticoagulants [Z79.01] [Z79.01]  Anti-phospholipid antibody syndrome (H) [D68.61]  DVT (deep venous thrombosis) (H) [I82.409]         Anticoagulation Episode Summary     INR check location     Preferred lab     Send INR reminders to [facility]    Comments       Anticoagulation Care Providers     Provider Role Specialty Phone number    Vahid Rush MD [facility] [phone]            See the Encounter Report to view Anticoagulation Flowsheet and Dosing Calendar (Go to Encounters tab in chart review, and find the Anticoagulation Therapy Visit)    Isaura Tang RN

## 2017-04-03 NOTE — MR AVS SNAPSHOT
Jorge A Carolann   4/3/2017 9:20 AM   Anticoagulation Therapy Visit    Description:  31 year old male   Provider:  СЕРГЕЙ JACOBSON   Department:  [facility]           INR as of 4/3/2017     Today's INR 2.3      Anticoagulation Summary as of 4/3/2017     INR goal 2.0-3.0   Today's INR 2.3   Full instructions 7.5 mg on Mon; 10 mg all other days   Next INR check 4/24/2017    Indications   Long-term (current) use of anticoagulants [Z79.01] [Z79.01]  Anti-phospholipid antibody syndrome (H) [D68.61]  DVT (deep venous thrombosis) (H) [I82.409]         Your next Anticoagulation Clinic appointment(s)     Apr 03, 2017  9:20 AM CDT   Anticoagulation Visit with СЕРГЕЙ JACOBSON   [facility] ([facility])    [address] [facility] [id number] [phone]            Apr 24, 2017  8:40 AM CDT   Anticoagulation Visit with СЕРГЕЙ JACOBSON   [facility] ([facility]    [address] [facility] [id number] [phone]              Contact Numbers     [facility]  Please call  [phone] to cancel and/or reschedule your appointment, or with any problems or questions regarding your therapy.        April 2017 Details    Sun Mon Tue Wed Thu Fri Sat           1                 2               3      7.5 mg   See details      4      10 mg         5      10 mg         6      10 mg         7      10 mg         8      10 mg           9      10 mg         10      7.5 mg         11      10 mg         12      10 mg         13      10 mg         14      10 mg         15      10 mg           16      10 mg         17      7.5 mg         18      10 mg         19      10 mg         20      10 mg         21      10 mg         22      10 mg           23      10 mg         24            25               26               27               28               29                 30                      Date Details   04/03 This INR check        Date of next INR:  4/24/2017         How to take your warfarin dose     To take:  7.5 mg Take 1.5 of the 5 mg tablets.    To take:  10 mg Take 2 of the 5 mg tablets.

## 2017-04-24 ENCOUNTER — ANTICOAGULATION THERAPY VISIT (OUTPATIENT)
Dept: NURSING | Facility: CLINIC | Age: 32
End: 2017-04-24
Payer: COMMERCIAL

## 2017-04-24 DIAGNOSIS — D68.61 ANTI-PHOSPHOLIPID ANTIBODY SYNDROME (H): ICD-10-CM

## 2017-04-24 DIAGNOSIS — I82.409 DVT (DEEP VENOUS THROMBOSIS) (H): ICD-10-CM

## 2017-04-24 DIAGNOSIS — Z79.01 LONG-TERM (CURRENT) USE OF ANTICOAGULANTS: ICD-10-CM

## 2017-04-24 LAB — INR POINT OF CARE: 2.2 (ref 0.86–1.14)

## 2017-04-24 PROCEDURE — 99207 ZZC NO CHARGE NURSE ONLY: CPT

## 2017-04-24 PROCEDURE — 85610 PROTHROMBIN TIME: CPT | Mod: QW

## 2017-04-24 PROCEDURE — 36416 COLLJ CAPILLARY BLOOD SPEC: CPT

## 2017-04-24 NOTE — MR AVS SNAPSHOT
Jorge JACOBSON Carolann   4/24/2017 8:40 AM   Anticoagulation Therapy Visit    Description:  31 year old male   Provider:  СЕРГЕЙ JACOBSON   Department:  [facility]           INR as of 4/24/2017     Today's INR 2.2      Anticoagulation Summary as of 4/24/2017     INR goal 2.0-3.0   Today's INR 2.2   Full instructions 7.5 mg on Mon; 10 mg all other days   Next INR check 5/22/2017    Indications   Long-term (current) use of anticoagulants [Z79.01] [Z79.01]  Anti-phospholipid antibody syndrome (H) [D68.61]  DVT (deep venous thrombosis) (H) [I82.409]         Your next Anticoagulation Clinic appointment(s)     Apr 24, 2017  8:40 AM CDT   Anticoagulation Visit with СЕРГЕЙ JACOBSON   [facility] ([facility])    [address] [facility] [id number] [phone]            May 22, 2017  8:40 AM CDT   Anticoagulation Visit with СЕРГЕЙ JACOBSON   [facility] ([facility]    [address] [address] [facility] [address] [facility] [id number] [phone]              Contact Numbers     [facility]  Please call  [phone] to cancel and/or reschedule your appointment, or with any problems or questions regarding your therapy.        April 2017 Details    Sun Mon Tue Wed Thu Fri Sat           1                 2               3               4               5               6               7               8                 9               10               11               12               13               14               15                 16               17               18               19               20               21               22                 23               24      7.5 mg   See details      25      10 mg         26      10 mg         27      10 mg         28      10 mg         29      10 mg           30      10 mg                Date Details   04/24 This INR check               How to take your warfarin dose     To take:  7.5 mg Take 1.5  of the 5 mg tablets.    To take:  10 mg Take 2 of the 5 mg tablets.           May 2017 Details    Sun Mon Tue Wed Thu Fri Sat      1      7.5 mg         2      10 mg         3      10 mg         4      10 mg         5      10 mg         6      10 mg           7      10 mg         8      7.5 mg         9      10 mg         10      10 mg         11      10 mg         12      10 mg         13      10 mg           14      10 mg         15      7.5 mg         16      10 mg         17      10 mg         18      10 mg         19      10 mg         20      10 mg           21      10 mg         22            23               24               25               26               27                 28               29               30               31                   Date Details   No additional details    Date of next INR:  5/22/2017         How to take your warfarin dose     To take:  7.5 mg Take 1.5 of the 5 mg tablets.    To take:  10 mg Take 2 of the 5 mg tablets.

## 2017-04-24 NOTE — PROGRESS NOTES
ANTICOAGULATION FOLLOW-UP CLINIC VISIT    Patient Name:  Jorge Vuong  Date:  4/24/2017  Contact Type:  Face to Face    SUBJECTIVE:     Patient Findings     Positives No Problem Findings           OBJECTIVE    INR Protime   Date Value Ref Range Status   04/24/2017 2.2 (A) 0.86 - 1.14 Final     Factor 2 Assay   Date Value Ref Range Status   10/07/2009 25 (L) 60 - 140 % Final       ASSESSMENT / PLAN  INR assessment THER    Recheck INR In: 4 WEEKS    INR Location Clinic      Anticoagulation Summary as of 4/24/2017     INR goal 2.0-3.0   Today's INR 2.2   Maintenance plan 7.5 mg (5 mg x 1.5) on Mon; 10 mg (5 mg x 2) all other days   Full instructions 7.5 mg on Mon; 10 mg all other days   Weekly total 67.5 mg   No change documented Isaura Tang RN   Plan last modified Isaura Tang RN (3/22/2017)   Next INR check 5/22/2017   Priority Factor 2   Target end date     Indications   Long-term (current) use of anticoagulants [Z79.01] [Z79.01]  Anti-phospholipid antibody syndrome (H) [D68.61]  DVT (deep venous thrombosis) (H) [I82.409]         Anticoagulation Episode Summary     INR check location     Preferred lab     Send INR reminders to [facility]    Comments       Anticoagulation Care Providers     Provider Role Specialty Phone number    Vahid Rush MD [facility] [phone]            See the Encounter Report to view Anticoagulation Flowsheet and Dosing Calendar (Go to Encounters tab in chart review, and find the Anticoagulation Therapy Visit)    Isaura Tang RN

## 2017-05-30 ENCOUNTER — TELEPHONE (OUTPATIENT)
Dept: NURSING | Facility: CLINIC | Age: 32
End: 2017-05-30

## 2017-05-30 NOTE — LETTER
[address] [address]  [phone]      June 19, 2017      Jorge Vuong  [address] [facility] [place] [address]              Dear Jorge,    This letter is being sent to you as a reminder that it is necessary for you to get your INR checked regularly so we can optimize your care. Our records indicate that you were last scheduled to have a test done in May 22, 2017.      It is very important that you have your INR checked regularly. Please make an appointment with me at your earliest convenience.      Thank you for helping us help you.        Sincerely,      Isaura Tang RN  Anticoagulation Clinic ([phone])

## 2017-05-30 NOTE — LETTER
[address] [address]  [phone]      June 19, 2017      Jorge Vuong  [address] [facility]  [place] [address]              Dear Jorge,    This letter is being sent to you as a reminder that it is necessary for you to get your INR checked regularly so we can optimize your care. Our records indicate that you were last scheduled to have a test done in May 22, 2017.      It is very important that you have your INR checked regularly. Please call to schedule an INR appointment by July 5, 2017 in order for [facility] to continue your Warfarin management.      Thank you for helping us help you.        Sincerely,      Isaura Tang RN  Anticoagulation Clinic ([phone])

## 2017-05-30 NOTE — LETTER
[address] [address]  [phone]      June 2, 2017      Jorge Vuong  [address] [facility] [place] [address]              Dear Jorge,    This letter is being sent to you as a reminder that it is necessary for you to get your INR checked regularly so we can optimize your care. Our records indicate that you were last scheduled to have a test done May 22, 2017.      It is very important that you have your INR checked regularly. Please make an appointment with me at your earliest convenience.      Thank you for helping us help you.        Sincerely,      Isaura Tang RN  Anticoagulation Clinic ([phone])

## 2017-05-30 NOTE — TELEPHONE ENCOUNTER
Patient was no-show for INR appointment on 05/22/2017.    This writer attempted to contact Jorge on 05/30/17    Reason for call reschedule missed INR appointment and was unable to leave message due to voicemail not being set-up    When patient calls back, please schedule appointment as soon as possible with INR .        Isaura Tang RN

## 2017-06-02 NOTE — TELEPHONE ENCOUNTER
Attempted to contact patient but was unable to leave a message as voicemail is still not set-up.    Patient was is overdue for INR appointment.  Please see below unsuccessful attempts to contact patient by phone.  INR reminder letter was mailed to patient's home address.  Will postpone encounter for two weeks.  If no response by 06/19/2017, will mail certified letter.  Isaura Tang RN

## 2017-06-19 NOTE — TELEPHONE ENCOUNTER
No response received from reminder letter mailed on 06/02/2017.  Certified letter (article number [id number]) mailed to patient's home address, will requested response date as 07/05/2017.  If no response by 07/05/2017, will route to provider to review and advise.  Isaura Tang RN

## 2017-07-05 ENCOUNTER — ANTICOAGULATION THERAPY VISIT (OUTPATIENT)
Dept: NURSING | Facility: CLINIC | Age: 32
End: 2017-07-05

## 2017-07-05 DIAGNOSIS — Z79.01 LONG-TERM (CURRENT) USE OF ANTICOAGULANTS: ICD-10-CM

## 2017-07-05 DIAGNOSIS — D68.61 ANTI-PHOSPHOLIPID ANTIBODY SYNDROME (H): ICD-10-CM

## 2017-07-05 DIAGNOSIS — I82.409 DVT (DEEP VENOUS THROMBOSIS) (H): ICD-10-CM

## 2017-07-05 NOTE — TELEPHONE ENCOUNTER
Please see below attempts - have been unable to reach patient by phone, reminder letter and certified letter.  Unable to safely manage patient's Warfarin at this time due to noncompliance.  Routing to provider to please review and advise if okay to remove patient from active [facility] patient list.  Isaura Tang RN

## 2017-11-15 ENCOUNTER — TELEPHONE (OUTPATIENT)
Dept: NURSING | Facility: CLINIC | Age: 32
End: 2017-11-15

## 2017-11-15 ENCOUNTER — OFFICE VISIT (OUTPATIENT)
Dept: FAMILY MEDICINE | Facility: CLINIC | Age: 32
End: 2017-11-15
Payer: COMMERCIAL

## 2017-11-15 VITALS
HEIGHT: 74 IN | SYSTOLIC BLOOD PRESSURE: 125 MMHG | BODY MASS INDEX: 36.96 KG/M2 | WEIGHT: 288 LBS | DIASTOLIC BLOOD PRESSURE: 77 MMHG | HEART RATE: 58 BPM | TEMPERATURE: 97.5 F | OXYGEN SATURATION: 96 %

## 2017-11-15 DIAGNOSIS — Z13.1 SCREENING FOR DIABETES MELLITUS: ICD-10-CM

## 2017-11-15 DIAGNOSIS — D68.61 ANTI-PHOSPHOLIPID ANTIBODY SYNDROME (H): ICD-10-CM

## 2017-11-15 DIAGNOSIS — Z00.00 ROUTINE HISTORY AND PHYSICAL EXAMINATION OF ADULT: Primary | ICD-10-CM

## 2017-11-15 DIAGNOSIS — Z13.6 CARDIOVASCULAR SCREENING; LDL GOAL LESS THAN 160: ICD-10-CM

## 2017-11-15 LAB
ALBUMIN SERPL-MCNC: 3.9 G/DL (ref 3.4–5)
ALP SERPL-CCNC: 92 U/L (ref 40–150)
ALT SERPL W P-5'-P-CCNC: 23 U/L (ref 0–70)
ANION GAP SERPL CALCULATED.3IONS-SCNC: 7 MMOL/L (ref 3–14)
AST SERPL W P-5'-P-CCNC: 17 U/L (ref 0–45)
BILIRUB SERPL-MCNC: 0.4 MG/DL (ref 0.2–1.3)
BUN SERPL-MCNC: 13 MG/DL (ref 7–30)
CALCIUM SERPL-MCNC: 8.4 MG/DL (ref 8.5–10.1)
CHLORIDE SERPL-SCNC: 114 MMOL/L (ref 94–109)
CHOLEST SERPL-MCNC: 132 MG/DL
CO2 SERPL-SCNC: 24 MMOL/L (ref 20–32)
CREAT SERPL-MCNC: 1.03 MG/DL (ref 0.66–1.25)
GFR SERPL CREATININE-BSD FRML MDRD: 84 ML/MIN/1.7M2
GLUCOSE SERPL-MCNC: 84 MG/DL (ref 70–99)
HDLC SERPL-MCNC: 42 MG/DL
LDLC SERPL CALC-MCNC: 77 MG/DL
NONHDLC SERPL-MCNC: 90 MG/DL
POTASSIUM SERPL-SCNC: 3.8 MMOL/L (ref 3.4–5.3)
PROT SERPL-MCNC: 6.7 G/DL (ref 6.8–8.8)
SODIUM SERPL-SCNC: 145 MMOL/L (ref 133–144)
TRIGL SERPL-MCNC: 63 MG/DL

## 2017-11-15 PROCEDURE — 36415 COLL VENOUS BLD VENIPUNCTURE: CPT | Performed by: FAMILY MEDICINE

## 2017-11-15 PROCEDURE — 80061 LIPID PANEL: CPT | Performed by: FAMILY MEDICINE

## 2017-11-15 PROCEDURE — 80053 COMPREHEN METABOLIC PANEL: CPT | Performed by: FAMILY MEDICINE

## 2017-11-15 PROCEDURE — 99395 PREV VISIT EST AGE 18-39: CPT | Performed by: FAMILY MEDICINE

## 2017-11-15 RX ORDER — WARFARIN SODIUM 5 MG/1
TABLET ORAL
Qty: 180 TABLET | Refills: 0 | Status: SHIPPED | OUTPATIENT
Start: 2017-11-15 | End: 2019-04-09

## 2017-11-15 ASSESSMENT — PAIN SCALES - GENERAL: PAINLEVEL: NO PAIN (0)

## 2017-11-15 NOTE — TELEPHONE ENCOUNTER
INR referral received 11/15/17 from Dr. Vahid AGUIRRE. Per OV notes, patient was off warfarin x 3 months due to no insurance but now is restarting. Added to patient list.    This writer attempted to contact patient on 11/15/17      Reason for call Set up INR appt  and unable to leave message.      If patient calls back:   Schedule INR visit for Friday 11/17 (or as soon as possible after this date if pt unable to be seen on 11/17).  Document that pt called and route to [facility] Anticoag pool  Route to [facility] Anticoag nurse for any questions or concerns that patient has.      Van Serrano RN

## 2017-11-15 NOTE — NURSING NOTE
"Chief Complaint   Patient presents with     Physical       Initial /77 (BP Location: Left arm, Patient Position: Chair, Cuff Size: Adult Large)  Pulse 58  Temp 97.5  F (36.4  C) (Oral)  Ht 6' 2\" (1.88 m)  Wt 288 lb (130.6 kg)  SpO2 96%  BMI 36.98 kg/m2 Estimated body mass index is 36.98 kg/(m^2) as calculated from the following:    Height as of this encounter: 6' 2\" (1.88 m).    Weight as of this encounter: 288 lb (130.6 kg).  Medication Reconciliation: complete     Steffi Nickerson MA      "

## 2017-11-15 NOTE — PATIENT INSTRUCTIONS
Preventive Health Recommendations  Male Ages 26 - 39    Yearly exam:             See your health care provider every year in order to  o   Review health changes.   o   Discuss preventive care.    o   Review your medicines if your doctor has prescribed any.    You should be tested each year for STDs (sexually transmitted diseases), if you re at risk.     After age 35, talk to your provider about cholesterol testing. If you are at risk for heart disease, have your cholesterol tested at least every 5 years.     If you are at risk for diabetes, you should have a diabetes test (fasting glucose).  Shots: Get a flu shot each year. Get a tetanus shot every 10 years.     Nutrition:    Eat at least 5 servings of fruits and vegetables daily.     Eat whole-grain bread, whole-wheat pasta and brown rice instead of white grains and rice.     Talk to your provider about Calcium and Vitamin D.     Lifestyle    Exercise for at least 150 minutes a week (30 minutes a day, 5 days a week). This will help you control your weight and prevent disease.     Limit alcohol to one drink per day.     No smoking.     Wear sunscreen to prevent skin cancer.     See your dentist every six months for an exam and cleaning.   At [facility], we strive to deliver an exceptional experience to you, every time we see you.  If you receive a survey in the mail, please send us back your thoughts. We really do value your feedback.    Based on your medical history, these are the current health maintenance/preventive care services that you are due for (some may have been done at this visit.)  Health Maintenance Due   Topic Date Due     OP ANNUAL INR REFERRAL  01/05/2017     INFLUENZA VACCINE (SYSTEM ASSIGNED)  09/01/2017         Suggested websites for health information:  Www.[employer].org : Up to date and easily searchable information on multiple topics.  Www.medlineplus.gov : medication info, interactive tutorials, watch real surgeries  online  Www.familydoctor.org : good info from the Academy of Family Physicians  Www.cdc.gov : public health info, travel advisories, epidemics (H1N1)  Www.aap.org : children's health info, normal development, vaccinations  Www.health.state[place].us : [place] dept of health, public health issues in [place], N1N1    Your care team:                            Family Medicine Internal Medicine   MD Keron Schilling MD Shantel Branch-Fleming, MD Katya Georgiev PA-C Nam Ho, MD Pediatrics   ARON Ignacio, ROJAS Mccloud APRN MD Sirisha Cline MD Deborah Mielke, MD Kim Thein, APRN CNP      Clinic hours: Monday - Thursday 7 am-7 pm; Fridays 7 am-5 pm.   Urgent care: Monday - Friday 11 am-9 pm; Saturday and Sunday 9 am-5 pm.  Pharmacy : Monday -Thursday 8 am-8 pm; Friday 8 am-6 pm; Saturday and Sunday 9 am-5 pm.     Clinic: [phone]   Pharmacy: [phone]

## 2017-11-15 NOTE — LETTER
[address] [address]  [phone]        December 18, 2017    Jorge Vuong  [address] [facility] [place] [address]              Dear Jorge Vuong    This is to remind you that your INR is overdue.  Your INR has not been drawn since 4/24/17.  It is vital to your safety to have your INR checked as recommended to prevent clots or bleeds.    You may call our office at [phone] to schedule an appointment with INR nurse or at lab.    Please disregard this notice if you have already had your labs drawn or made an appointment.        Sincerely,      [facility] [place]

## 2017-11-15 NOTE — PROGRESS NOTES
SUBJECTIVE:   CC: Jorge Vuong is an 32 year old male who presents for preventative health visit.     Physical   Annual:     Getting at least 3 servings of Calcium per day::  Yes    Bi-annual eye exam::  NO    Dental care twice a year::  NO    Sleep apnea or symptoms of sleep apnea::  None    Diet::  Regular (no restrictions)    Frequency of exercise::  4-5 days/week    Duration of exercise::  30-45 minutes    Taking medications regularly::  Yes    Medication side effects::  None    Additional concerns today::  YES (discuss getting back on coumadin)      Today's PHQ-2 Score: PHQ-2 ( 1999 Pfizer) 11/15/2017   Q1: Little interest or pleasure in doing things 0   Q2: Feeling down, depressed or hopeless 0   PHQ-2 Score 0       Abuse: Current or Past(Physical, Sexual or Emotional)- No  Do you feel safe in your environment - Yes    Social History   Substance Use Topics     Smoking status: Current Some Day Smoker     Years: 4.00     Smokeless tobacco: Never Used      Comment: 1-3 cigs per month     Alcohol use No         Last PSA: No results found for: PSA    Reviewed orders with patient. Reviewed health maintenance and updated orders accordingly - Yes  Labs reviewed in EPIC    Reviewed and updated as needed this visit by clinical staff  Tobacco  Allergies  Meds  Med Hx  Surg Hx  Fam Hx  Soc Hx        Reviewed and updated as needed this visit by Provider            Review of Systems  C: NEGATIVE for fever, chills, change in weight  I: NEGATIVE for worrisome rashes, moles or lesions  E: NEGATIVE for vision changes or irritation  ENT: NEGATIVE for ear, mouth and throat problems  R: NEGATIVE for significant cough or SOB  CV: NEGATIVE for chest pain, palpitations or peripheral edema  GI: NEGATIVE for nausea, abdominal pain, heartburn, or change in bowel habits   male: negative for dysuria, hematuria, decreased urinary stream, erectile dysfunction, urethral discharge  M: NEGATIVE for significant arthralgias or  "myalgia  N: NEGATIVE for weakness, dizziness or paresthesias  P: NEGATIVE for changes in mood or affect    OBJECTIVE:   /77 (BP Location: Left arm, Patient Position: Chair, Cuff Size: Adult Large)  Pulse 58  Temp 97.5  F (36.4  C) (Oral)  Ht 6' 2\" (1.88 m)  Wt 288 lb (130.6 kg)  SpO2 96%  BMI 36.98 kg/m2    Physical Exam  GENERAL: healthy, alert and no distress  NECK: no adenopathy, no asymmetry, masses, or scars and thyroid normal to palpation  RESP: lungs clear to auscultation - no rales, rhonchi or wheezes  CV: regular rate and rhythm, normal S1 S2, no S3 or S4, no murmur, click or rub, no peripheral edema and peripheral pulses strong  ABDOMEN: soft, nontender, no hepatosplenomegaly, no masses and bowel sounds normal  MS: no gross musculoskeletal defects noted, no edema    ASSESSMENT/PLAN:   1. Routine history and physical examination of adult  As below.    2. CARDIOVASCULAR SCREENING; LDL GOAL LESS THAN 160    - Comprehensive metabolic panel (BMP + Alb, Alk Phos, ALT, AST, Total. Bili, TP)  - Lipid panel reflex to direct LDL Fasting    3. Screening for diabetes mellitus    - Lipid panel reflex to direct LDL Fasting    4. Anti-phospholipid antibody syndrome (H)  Off medication for 3 months due to no insurance. Restart. Referred to INR clinic for monitoring.  - warfarin (COUMADIN) 5 MG tablet; Take 1.5 tablets (7.5mg) Monday and 2 tablets (10mg) other 6 days of week or as directed by [facility] nurse  Dispense: 180 tablet; Refill: 0  - INR CLINIC REFERRAL    COUNSELING:   Reviewed preventive health counseling, as reflected in patient instructions       Regular exercise       Healthy diet/nutrition           reports that he has been smoking.  He has smoked for the past 4.00 years. He has never used smokeless tobacco.  Tobacco Cessation Action Plan: Information offered: Patient not interested at this time  Estimated body mass index is 36.98 kg/(m^2) as calculated from the following:    Height as of this " "encounter: 6' 2\" (1.88 m).    Weight as of this encounter: 288 lb (130.6 kg).         Counseling Resources:  ATP IV Guidelines  Pooled Cohorts Equation Calculator  FRAX Risk Assessment  ICSI Preventive Guidelines  Dietary Guidelines for Americans, 2010  USDA's MyPlate  ASA Prophylaxis  Lung CA Screening    Vahid Rush MD, MD  [facility]  "

## 2017-11-15 NOTE — MR AVS SNAPSHOT
After Visit Summary   11/15/2017    Jorge Vuong    MRN: [id number]           Patient Information     Date Of Birth          1985        Visit Information        Provider Department      11/15/2017 7:20 AM Vahid Rush MD [facility]        Today's Diagnoses     Routine history and physical examination of adult    -  1    CARDIOVASCULAR SCREENING; LDL GOAL LESS THAN 160        Screening for diabetes mellitus        Anti-phospholipid antibody syndrome (H)          Care Instructions      Preventive Health Recommendations  Male Ages 26 - 39    Yearly exam:             See your health care provider every year in order to  o   Review health changes.   o   Discuss preventive care.    o   Review your medicines if your doctor has prescribed any.    You should be tested each year for STDs (sexually transmitted diseases), if you re at risk.     After age 35, talk to your provider about cholesterol testing. If you are at risk for heart disease, have your cholesterol tested at least every 5 years.     If you are at risk for diabetes, you should have a diabetes test (fasting glucose).  Shots: Get a flu shot each year. Get a tetanus shot every 10 years.     Nutrition:    Eat at least 5 servings of fruits and vegetables daily.     Eat whole-grain bread, whole-wheat pasta and brown rice instead of white grains and rice.     Talk to your provider about Calcium and Vitamin D.     Lifestyle    Exercise for at least 150 minutes a week (30 minutes a day, 5 days a week). This will help you control your weight and prevent disease.     Limit alcohol to one drink per day.     No smoking.     Wear sunscreen to prevent skin cancer.     See your dentist every six months for an exam and cleaning.   At [facility], we strive to deliver an exceptional experience to you, every time we see you.  If you receive a survey in the mail, please send us back your thoughts. We really do value  your feedback.    Based on your medical history, these are the current health maintenance/preventive care services that you are due for (some may have been done at this visit.)  Health Maintenance Due   Topic Date Due     OP ANNUAL INR REFERRAL  01/05/2017     INFLUENZA VACCINE (SYSTEM ASSIGNED)  09/01/2017         Suggested websites for health information:  Www.[employer].org : Up to date and easily searchable information on multiple topics.  Www.medlineplus.gov : medication info, interactive tutorials, watch real surgeries online  Www.familydoctor.org : good info from the Academy of Family Physicians  Www.cdc.gov : public health info, travel advisories, epidemics (H1N1)  Www.aap.org : children's health info, normal development, vaccinations  Www.health.[facility].[place].us : [place] dept of health, public health issues in [place], N1N1    Your care team:                            Family Medicine Internal Medicine   MD Keron Schilling MD Shantel Branch-Fleming, MD Katya Georgiev PA-C Nam Ho, MD Pediatrics   ARON Ignacio, CNP Daniela Mccloud APRN CNP   MD Sirisha Ruiz MD Deborah Mielke, MD Kim Thein, APRN CNP      Clinic hours: Monday - Thursday 7 am-7 pm; Fridays 7 am-5 pm.   Urgent care: Monday - Friday 11 am-9 pm; Saturday and Sunday 9 am-5 pm.  Pharmacy : Monday -Thursday 8 am-8 pm; Friday 8 am-6 pm; Saturday and Sunday 9 am-5 pm.     Clinic: [phone]   Pharmacy: [phone]            Follow-ups after your visit        Additional Services     INR CLINIC REFERRAL       Your provider has referred you to INR Services.    Please be aware that coverage of these services is subject to the terms and limitations of your health insurance plan.  Call member services at your health plan with any benefit or coverage questions.    Indication for Anticoagulation: Other: anti-phospholipid antibody syndrome  If nonstandard INR is desired, indicate goal range and  "explanation: 2-3  Expected Duration of Therapy: Lifetime                  Who to contact     If you have questions or need follow up information about today's clinic visit or your schedule please contact [facility] directly at [phone].  Normal or non-critical lab and imaging results will be communicated to you by [employer], letter or phone within 4 business days after the clinic has received the results. If you do not hear from us within 7 days, please contact the clinic through [employer] or phone. If you have a critical or abnormal lab result, we will notify you by phone as soon as possible.  Submit refill requests through [email or web address] or call your pharmacy and they will forward the refill request to us. Please allow 3 business days for your refill to be completed.          Additional Information About Your Visit        [employer] Information     [email or web address] lets you send messages to your doctor, view your test results, renew your prescriptions, schedule appointments and more. To sign up, go to [email or web address] . Click on \"Log in\" on the left side of the screen, which will take you to the Welcome page. Then click on \"Sign up Now\" on the right side of the page.     You will be asked to enter the access code listed below, as well as some personal information. Please follow the directions to create your username and password.     Your access code is: [email or web address]  Expires: 2018  7:43 AM     Your access code will  in 90 days. If you need help or a new code, please call your [facility] or [phone].        Care EveryWhere ID     This is your Care EveryWhere ID. This could be used by other organizations to access your [place] medical records  [id number]        Your Vitals Were     Pulse Temperature Height Pulse Oximetry BMI (Body Mass Index)       58 97.5  F (36.4  C) (Oral) 6' 2\" (1.88 m) 96% 36.98 kg/m2        Blood Pressure from Last 3 Encounters:   11/15/17 125/77   17 " 115/68   02/02/16 134/69    Weight from Last 3 Encounters:   11/15/17 288 lb (130.6 kg)   01/27/17 269 lb 12.8 oz (122.4 kg)   02/02/16 253 lb 9.6 oz (115 kg)              We Performed the Following     Comprehensive metabolic panel (BMP + Alb, Alk Phos, ALT, AST, Total. Bili, TP)     INR CLINIC REFERRAL     Lipid panel reflex to direct LDL Fasting          Where to get your medicines      These medications were sent to [id number] - [place] - [address] [place]  [address] [place] [address]     Phone:  [phone]     warfarin 5 MG tablet          Primary Care Provider Office Phone # Fax #    Vahid Rush [phone]       [address]  [facility] [address]        Equal Access to Services     [facility]: Hadii aad ku hadasho Soomaali, waaxda luqadaha, qaybta kaalmada adeegyada, waxay idiin hayaan daphne funk . So [facility] [phone].    ATENCIÓN: Si habla español, tiene a holden disposición servicios gratuitos de asistencia lingüística. Llame al [phone].    We comply with applicable federal civil rights laws and [place] laws. We do not discriminate on the basis of race, color, national origin, age, disability, sex, sexual orientation, or gender identity.            Thank you!     Thank you for choosing [facility]  for your care. Our goal is always to provide you with excellent care. Hearing back from our patients is one way we can continue to improve our services. Please take a few minutes to complete the written survey that you may receive in the mail after your visit with us. Thank you!             Your Updated Medication List - Protect others around you: Learn how to safely use, store and throw away your medicines at www.disposemymeds.org.          This list is accurate as of: 11/15/17  7:43 AM.  Always use your most recent med list.                   Brand Name Dispense Instructions for use Diagnosis    ASPIRIN PO            warfarin 5 MG tablet    COUMADIN    180 tablet    Take 1.5 tablets (7.5mg) Monday and 2 tablets (10mg) other 6 days of week or as directed by [facility] nurse    Anti-phospholipid antibody syndrome (H)

## 2017-11-15 NOTE — LETTER
November 16, 2017      Jorge Vuong  [address]  [place] [address]              Dear Jorge,    Your cholesterol, kidney, liver, electrolyte tests were normal. Please follow up in 1 year for routine physical.    Sincerely,  Vahid Rush MD/karla  Results for orders placed or performed in visit on 11/15/17   Comprehensive metabolic panel (BMP + Alb, Alk Phos, ALT, AST, Total. Bili, TP)   Result Value Ref Range    Sodium 145 (H) 133 - 144 mmol/L    Potassium 3.8 3.4 - 5.3 mmol/L    Chloride 114 (H) 94 - 109 mmol/L    Carbon Dioxide 24 20 - 32 mmol/L    Anion Gap 7 3 - 14 mmol/L    Glucose 84 70 - 99 mg/dL    Urea Nitrogen 13 7 - 30 mg/dL    Creatinine 1.03 0.66 - 1.25 mg/dL    GFR Estimate 84 >60 mL/min/1.7m2    GFR Estimate If Black >90 >60 mL/min/1.7m2    Calcium 8.4 (L) 8.5 - 10.1 mg/dL    Bilirubin Total 0.4 0.2 - 1.3 mg/dL    Albumin 3.9 3.4 - 5.0 g/dL    Protein Total 6.7 (L) 6.8 - 8.8 g/dL    Alkaline Phosphatase 92 40 - 150 U/L    ALT 23 0 - 70 U/L    AST 17 0 - 45 U/L   Lipid panel reflex to direct LDL Fasting   Result Value Ref Range    Cholesterol 132 <200 mg/dL    Triglycerides 63 <150 mg/dL    HDL Cholesterol 42 >39 mg/dL    LDL Cholesterol Calculated 77 <100 mg/dL    Non HDL Cholesterol 90 <130 mg/dL

## 2017-12-11 NOTE — TELEPHONE ENCOUNTER
Telephone call attempted to pt, no answer. Unable to leave message as pt's voicemail is not set up.  Will need to try calling later.    If pt does return call, please assist pt in scheduling INR appt or INR at lab.    Roel Oswald RN, BSN

## 2017-12-15 NOTE — TELEPHONE ENCOUNTER
Telephone call attempted to patient again, no answer.  Attempted to call emergency contact to request message given for Jorge to call back, but that number is not in service.    Martha Martins, PharmD BCACP  Anticoagulation Clinical Pharmacist

## 2017-12-18 NOTE — TELEPHONE ENCOUNTER
Mailed letter out to pt, see under letters for further information.  Pt has been noncompliant in the past and was discharged from [facility] in July 2017.    Roel Oswald RN, BSN

## 2019-04-01 ENCOUNTER — HOSPITAL ENCOUNTER (EMERGENCY)
Facility: CLINIC | Age: 34
Discharge: HOME | End: 2019-04-01
Attending: PHYSICIAN ASSISTANT | Admitting: PHYSICIAN ASSISTANT
Payer: COMMERCIAL

## 2019-04-01 VITALS
SYSTOLIC BLOOD PRESSURE: 150 MMHG | RESPIRATION RATE: 16 BRPM | HEART RATE: 64 BPM | BODY MASS INDEX: 30.81 KG/M2 | TEMPERATURE: 98.1 F | DIASTOLIC BLOOD PRESSURE: 77 MMHG | OXYGEN SATURATION: 99 % | WEIGHT: 240 LBS

## 2019-04-01 DIAGNOSIS — S61.213A LACERATION OF LEFT MIDDLE FINGER WITHOUT FOREIGN BODY WITHOUT DAMAGE TO NAIL, INITIAL ENCOUNTER: ICD-10-CM

## 2019-04-01 PROCEDURE — 90715 TDAP VACCINE 7 YRS/> IM: CPT | Performed by: PHYSICIAN ASSISTANT

## 2019-04-01 PROCEDURE — 90471 IMMUNIZATION ADMIN: CPT | Performed by: PHYSICIAN ASSISTANT

## 2019-04-01 PROCEDURE — 25000128 H RX IP 250 OP 636: Performed by: PHYSICIAN ASSISTANT

## 2019-04-01 PROCEDURE — 99213 OFFICE O/P EST LOW 20 MIN: CPT | Mod: Z6 | Performed by: PHYSICIAN ASSISTANT

## 2019-04-01 PROCEDURE — G0463 HOSPITAL OUTPT CLINIC VISIT: HCPCS | Performed by: PHYSICIAN ASSISTANT

## 2019-04-01 RX ADMIN — CLOSTRIDIUM TETANI TOXOID ANTIGEN (FORMALDEHYDE INACTIVATED), CORYNEBACTERIUM DIPHTHERIAE TOXOID ANTIGEN (FORMALDEHYDE INACTIVATED), BORDETELLA PERTUSSIS TOXOID ANTIGEN (GLUTARALDEHYDE INACTIVATED), BORDETELLA PERTUSSIS FILAMENTOUS HEMAGGLUTININ ANTIGEN (FORMALDEHYDE INACTIVATED), BORDETELLA PERTUSSIS PERTACTIN ANTIGEN, AND BORDETELLA PERTUSSIS FIMBRIAE 2/3 ANTIGEN 0.5 ML: 5; 2; 2.5; 5; 3; 5 INJECTION, SUSPENSION INTRAMUSCULAR at 16:28

## 2019-04-01 ASSESSMENT — ENCOUNTER SYMPTOMS
NUMBNESS: 0
FEVER: 0
COLOR CHANGE: 0
WEAKNESS: 0
WOUND: 1

## 2019-04-01 NOTE — ED PROVIDER NOTES
History     Chief Complaint   Patient presents with     Laceration     left hand middle finger vs saw     HPI    Jorge Vuong is a 33 year old male who presents to the clinic with a laceration on the left middle finger sustained 1 hours ago.  This is a non-work related and self-inflicted injury.  Mechanism of injury: patient accidentally cut it on a miter saw.  Patient states he turned on the saw and his finger was too close to the blade nicked the tip of his finger.  Patient denies any bone pain, decreased range of motion, nail injury, or numbness/tingling.    Associated symptoms: Denies numbness, weakness, or loss of function  Last tetanus booster within 5 years: no    Patient states he is supposed to be taking Coumadin daily, but has not done this for several months.    Problem list, Medication list, Allergies, and Medical/Social/Surgical histories reviewed in EPIC and updated as appropriate.        Allergies:  Allergies   Allergen Reactions     No Known Allergies        Problem List:    Patient Active Problem List    Diagnosis Date Noted     Long-term (current) use of anticoagulants [Z79.01] 04/08/2016     Priority: Medium     Long term current use of anticoagulant therapy 10/22/2014     Priority: Medium     Problem list name updated by automated process. Provider to review       DVT (deep venous thrombosis) (H) 08/16/2013     Priority: Medium     Anxiety 06/13/2011     Priority: Medium     CARDIOVASCULAR SCREENING; LDL GOAL LESS THAN 160 10/31/2010     Priority: Medium     Anti-phospholipid antibody syndrome (H) 05/03/2010     Priority: Medium     Arthralgia 05/03/2010     Priority: Medium        Past Medical History:    Past Medical History:   Diagnosis Date     APS (antiphospholipid syndrome) (H)        Past Surgical History:    Past Surgical History:   Procedure Laterality Date     C APPENDECTOMY  1/2009     C INSERT ABDOMEN-VENOUS SHUNT  8-27-09    spleno/renal shunt placed       Family History:     Family History   Problem Relation Age of Onset     Arthritis Mother         rheumatoid arthritis     Cancer Maternal Grandmother         brain      Heart Disease Maternal Grandfather      Psychotic Disorder Paternal Grandmother         depression     Depression Paternal Grandmother      Thyroid Disease Sister      Depression Sister      Mental Illness Sister      Depression Father      Substance Abuse Father      Suicide Maternal Uncle      Schizophrenia Maternal Uncle      Depression Other      Anxiety Disorder Other        Social History:  Marital Status:  Single [1]  Social History     Tobacco Use     Smoking status: Current Some Day Smoker     Years: 4.00     Smokeless tobacco: Never Used     Tobacco comment: 1-3 cigs per month   Substance Use Topics     Alcohol use: No     Drug use: No        Medications:      ASPIRIN PO   warfarin (COUMADIN) 5 MG tablet         Review of Systems   Constitutional: Negative for fever.   Skin: Positive for wound. Negative for color change, pallor and rash.        Laceration to the tip of left 3rd finger.    Neurological: Negative for weakness and numbness.   All other systems reviewed and are negative.      Physical Exam   BP: 150/77  Pulse: 64  Temp: 98.1  F (36.7  C)  Resp: 16  Weight: 108.9 kg (240 lb)  SpO2: 99 %      Physical Exam   Constitutional: He is oriented to person, place, and time. He appears well-developed and well-nourished. No distress.   Cardiovascular: Intact distal pulses.   Musculoskeletal:        Left hand: He exhibits tenderness and laceration. He exhibits normal range of motion, no bony tenderness, normal two-point discrimination, normal capillary refill, no deformity and no swelling. Decreased sensation noted. Normal strength noted.        Hands:  Patient with full range of motion in the fingers of the left hand with and without resistance during flexion and extension.  Offered x-ray to the left third finger, but patient declined in office today.    Neurological: He is alert and oriented to person, place, and time. He has normal strength. No sensory deficit. GCS eye subscore is 4. GCS verbal subscore is 5. GCS motor subscore is 6.   Skin: Skin is warm. Capillary refill takes less than 2 seconds. No rash noted. He is not diaphoretic. No erythema. No pallor.   Psychiatric: He has a normal mood and affect. His behavior is normal. Judgment and thought content normal.   Nursing note and vitals reviewed.      ED Course        Laceration repair  Date/Time: 4/1/2019 4:30 PM  Performed by: Rosa Baltazar PA-C  Authorized by: Rosa Baltazar PA-C   Consent: Verbal consent obtained.  Consent given by: patient  Patient identity confirmed: verbally with patient  Body area: upper extremity  Location details: left long finger  Wound length (cm): two lacerations both 1 cm in length parallel to each other on the tip of the finger.   Foreign bodies: no foreign bodies  Tendon involvement: none  Nerve involvement: none  Vascular damage: no  Irrigation solution: saline (hibiclens)  Irrigation method: syringe  Amount of cleaning: extensive  Debridement: none  Degree of undermining: none  Dressing: antibiotic ointment and tube gauze (non adhesive dressing)  Patient tolerance: Patient tolerated the procedure well with no immediate complications  Comments: Both lacerations very superficial and macerated in presentation with no flap or gaping noted.  No indication for suture repair since these are more consistent with a deep abrasion versus laceration.  Area cleaned very well in office, bacitracin applied topically, and nonadhesive dressing with tube gauze also applied.  Patient declined x-ray in office today.                     Critical Care time:  none               No results found for this or any previous visit (from the past 24 hour(s)).    Medications   Tdap (tetanus-diphtheria-acell pertussis) (ADACEL) injection 0.5 mL (0.5 mLs Intramuscular Given 4/1/19 8228)        Assessments & Plan (with Medical Decision Making)     I have reviewed the nursing notes.    I have reviewed the findings, diagnosis, plan and need for follow up with the patient.   33-year-old male presents the urgent care with left third finger injury from miter saw that occurred at his presents today when he was helping putting steven.   patient's tetanus updated in office today.  See exam findings and laceration repair note above.  Patient declined imaging at this time.  No damage to the nailbed or nail noted.  Secondary closure recommended and discussed with patient since the very superficial lacerations are so macerated with no significant tissue to suture closed.  No concerns for tendon or bone involvement at this time.  Patient follow-up with primary care doctor for recheck in a few days if symptoms persist or signs or symptoms of infection occur these were discussed with patient and given on discharge paperwork.  Patient discharged in stable condition.       Medication List      There are no discharge medications for this visit.         Final diagnoses:   Laceration of left middle finger without foreign body without damage to nail, initial encounter       4/1/2019   [facility] EMERGENCY DEPARTMENT     Rosa Baltazar PA-C  04/01/19 [id number]

## 2019-04-01 NOTE — DISCHARGE INSTRUCTIONS
After care instructions:  Keep wound clean and dry for the next 24-48 hours  Signs of infection discussed today  Apply anti-bacterial ointment for 5 days  May return to work as long as wound is kept clean and dry  Discussed the probability of scarring    Tetanus given in office today    Follow up with PCP or return to care in 3 days.    Return to clinic sooner or go to Emergency Room if symptoms worsen or change or signs of infection occur (redness, red streaking, warmth, increased pain, increased swelling, purulent drainage, or fevers occur.)    May use acetaminophen, ibuprofen as needed for pain.     Patient voiced understanding of instructions given.

## 2019-04-01 NOTE — ED AVS SNAPSHOT
[facility] Emergency Department  [address] [address]  Phone:  [phone]  Fax:  [phone]                                    Jorge Vuong   MRN: [id number]    Department:  [facility] Emergency Department   Date of Visit:  4/1/2019           After Visit Summary Signature Page    I have received my discharge instructions, and my questions have been answered. I have discussed any challenges I see with this plan with the nurse or doctor.    ..........................................................................................................................................  Patient/Patient Representative Signature      ..........................................................................................................................................  Patient Representative Print Name and Relationship to Patient    ..................................................               ................................................  Date                                   Time    ..........................................................................................................................................  Reviewed by Signature/Title    ...................................................              ..............................................  Date                                               Time          22EPIC Rev 08/18

## 2019-04-09 ENCOUNTER — OFFICE VISIT (OUTPATIENT)
Dept: FAMILY MEDICINE | Facility: CLINIC | Age: 34
End: 2019-04-09
Payer: COMMERCIAL

## 2019-04-09 VITALS
DIASTOLIC BLOOD PRESSURE: 66 MMHG | WEIGHT: 240 LBS | SYSTOLIC BLOOD PRESSURE: 125 MMHG | TEMPERATURE: 98.6 F | HEART RATE: 77 BPM | BODY MASS INDEX: 30.8 KG/M2 | HEIGHT: 74 IN | OXYGEN SATURATION: 97 %

## 2019-04-09 DIAGNOSIS — D68.61 ANTI-PHOSPHOLIPID ANTIBODY SYNDROME (H): ICD-10-CM

## 2019-04-09 DIAGNOSIS — S61.213D LACERATION OF LEFT MIDDLE FINGER WITHOUT FOREIGN BODY WITHOUT DAMAGE TO NAIL, SUBSEQUENT ENCOUNTER: Primary | ICD-10-CM

## 2019-04-09 PROCEDURE — 99213 OFFICE O/P EST LOW 20 MIN: CPT | Performed by: FAMILY MEDICINE

## 2019-04-09 RX ORDER — WARFARIN SODIUM 5 MG/1
TABLET ORAL
Qty: 180 TABLET | Refills: 0 | Status: SHIPPED | OUTPATIENT
Start: 2019-04-09 | End: 2020-09-15

## 2019-04-09 ASSESSMENT — PAIN SCALES - GENERAL: PAINLEVEL: NO PAIN (0)

## 2019-04-09 ASSESSMENT — MIFFLIN-ST. JEOR: SCORE: 2103.38

## 2019-04-09 NOTE — PROGRESS NOTES
SUBJECTIVE:   Jorge Vuong is a 33 year old male who presents to clinic today for the following   health issues:      Joint Pain    Onset: 4/1/19    Description:   Location: Left Middle Finger  Character: Throbbing if he hit it    Intensity: mild    Progression of Symptoms: better, intermittent    Accompanying Signs & Symptoms:  Other symptoms: none    History:   Previous similar pain: YES      Precipitating factors:   Trauma or overuse: YES    Alleviating factors:  Improved by: seen in ER 4/1/19    Therapies Tried and outcome: Seen in ER       Additional history: as documented    Reviewed  and updated as needed this visit by clinical staff  Tobacco  Allergies  Meds  Med Hx  Surg Hx  Fam Hx  Soc Hx        Reviewed and updated as needed this visit by Provider         Patient Active Problem List   Diagnosis     Anti-phospholipid antibody syndrome (H)     Arthralgia     CARDIOVASCULAR SCREENING; LDL GOAL LESS THAN 160     Anxiety     DVT (deep venous thrombosis) (H)     Long term current use of anticoagulant therapy     Long-term (current) use of anticoagulants [Z79.01]     Past Surgical History:   Procedure Laterality Date     C APPENDECTOMY  1/2009     C INSERT ABDOMEN-VENOUS SHUNT  8-27-09    spleno/renal shunt placed       Social History     Tobacco Use     Smoking status: Current Some Day Smoker     Years: 4.00     Smokeless tobacco: Never Used     Tobacco comment: 1-3 cigs per month   Substance Use Topics     Alcohol use: No     Family History   Problem Relation Age of Onset     Arthritis Mother         rheumatoid arthritis     Cancer Maternal Grandmother         brain      Heart Disease Maternal Grandfather      Psychotic Disorder Paternal Grandmother         depression     Depression Paternal Grandmother      Thyroid Disease Sister      Depression Sister      Mental Illness Sister      Depression Father      Substance Abuse Father      Suicide Maternal Uncle      Schizophrenia Maternal Uncle       "Depression Other      Anxiety Disorder Other            ROS:  Constitutional, HEENT, cardiovascular, pulmonary, GI, , musculoskeletal, neuro, skin, endocrine and psych systems are negative, except as otherwise noted.    OBJECTIVE:     /66 (BP Location: Left arm, Patient Position: Chair, Cuff Size: Adult Large)   Pulse 77   Temp 98.6  F (37  C) (Oral)   Ht 1.88 m (6' 2\")   Wt 108.9 kg (240 lb)   SpO2 97%   BMI 30.81 kg/m    Body mass index is 30.81 kg/m .  GENERAL: healthy, alert and no distress  NECK: no adenopathy, no asymmetry, masses, or scars and thyroid normal to palpation  RESP: lungs clear to auscultation - no rales, rhonchi or wheezes  CV: regular rate and rhythm, normal S1 S2, no S3 or S4, no murmur, click or rub, no peripheral edema and peripheral pulses strong  ABDOMEN: soft, nontender, no hepatosplenomegaly, no masses and bowel sounds normal  MS: no gross musculoskeletal defects noted, no edema  SKIN: left distal 3rd digit lacerated with good granulation tissue development  Diagnostic Test Results:  none     ASSESSMENT/PLAN:       1. Laceration of left middle finger without foreign body without damage to nail, subsequent encounter  Healing. Keep topical antibiotic on after dressing changes. Discussed signs and symptoms of infection.    2. Anti-phospholipid antibody syndrome (H)  Restart anticoagulation with warfarin. Referred to INR clinic. Patient has history of compliance issues.  - warfarin (COUMADIN) 5 MG tablet; Take 1.5 tablets (7.5mg) Monday and 2 tablets (10mg) other 6 days of week or as directed by [facility] nurse  Dispense: 180 tablet; Refill: 0  - INR CLINIC REFERRAL    See Patient Instructions    Vahid Rush MD, MD  [facility]      "

## 2019-04-09 NOTE — PATIENT INSTRUCTIONS
================================================================================  Normal Values   Blood pressure  <140/90 for most adults    <130/80 for some chronic diseases (ask your care team about yours)    BMI (body mass index)  18.5-25 kg/m2 (based on height and weight)     Thank you for visiting [facility]    Normal or non-critical lab and imaging results will be communicated to you by MyChart, letter or phone within 7 days.  If you do not hear from us within 10 days, please call the clinic. If you have a critical or abnormal lab result, we will notify you by phone as soon as possible.     If you have any questions regarding your visit please contact:     Team Comfort:   Clinic Hours Telephone Number   Dr. Esteban Rush Dr. [place] 7am-5pm  Monday - Friday [phone]  Real RN  Isis RN  Alejandrina RN   Pharmacy 8:00am-8pm Monday-Friday    9am-5pm Saturday-Sunday [phone]   Urgent Care 11am-9pm Monday-Friday        9am-5pm Saturday-Sunday [phone]     After hours, weekend or if you need to make an appointment with your primary provider please call [phone].   After Hours nurse advise: call [facility]: [phone]    Medication Refills:  Call your pharmacy and they will forward the refill to us. Please allow 3 business days for your refills to be completed.

## 2019-04-10 ENCOUNTER — TELEPHONE (OUTPATIENT)
Dept: FAMILY MEDICINE | Facility: CLINIC | Age: 34
End: 2019-04-10

## 2019-04-10 NOTE — TELEPHONE ENCOUNTER
Provider, [facility] received your INR Referral for patient.     It appears patient was advised to resume his previous maintenance dosing for warfarin - 7.5 mg on Monday and 10 mg AOD.     Per chart review, it appears patient is scheduled for first INR appointment next week on Friday 4/19/19 by  staff. Routing as an FYI.     Please route response back to  ANTICOAG CLINIC [[id number]].    Sharyn Marcano RN

## 2019-04-19 ENCOUNTER — ANTICOAGULATION THERAPY VISIT (OUTPATIENT)
Dept: NURSING | Facility: CLINIC | Age: 34
End: 2019-04-19
Payer: COMMERCIAL

## 2019-04-19 DIAGNOSIS — D68.61 ANTI-PHOSPHOLIPID ANTIBODY SYNDROME (H): ICD-10-CM

## 2019-04-19 LAB — INR POINT OF CARE: 2.2 (ref 0.86–1.14)

## 2019-04-19 PROCEDURE — 36416 COLLJ CAPILLARY BLOOD SPEC: CPT

## 2019-04-19 PROCEDURE — 85610 PROTHROMBIN TIME: CPT | Mod: QW

## 2019-04-19 PROCEDURE — 99207 ZZC NO CHARGE NURSE ONLY: CPT

## 2019-04-19 NOTE — PROGRESS NOTES
ANTICOAGULATION FOLLOW-UP CLINIC VISIT    Patient Name:  Jorge Vuong  Date:  2019  Contact Type:  Face to Face    SUBJECTIVE:     Patient Findings            OBJECTIVE    INR Protime   Date Value Ref Range Status   2019 2.2 (A) 0.86 - 1.14 Final     Factor 2 Assay   Date Value Ref Range Status   10/07/2009 25 (L) 60 - 140 % Final       ASSESSMENT / PLAN  INR assessment THER    Recheck INR In: 2 WEEKS    INR Location Clinic      Anticoagulation Summary  As of 2019    INR goal:   2.0-3.0   TTR:   14.9 % (1 y)   INR used for dosin.2 (2019)   Warfarin maintenance plan:   12.5 mg (5 mg x 2.5) every Tue; 10 mg (5 mg x 2) all other days   Full warfarin instructions:   12.5 mg every Tue; 10 mg all other days   Weekly warfarin total:   72.5 mg   Plan last modified:   Su Burciaga RN (2019)   Next INR check:   5/3/2019   Target end date:       Indications    Anti-phospholipid antibody syndrome (H) [D68.61]             Anticoagulation Episode Summary     INR check location:   Anticoagulation Clinic    Preferred lab:       Send INR reminders to:    [facility]    Comments:         Anticoagulation Care Providers     Provider Role Specialty Phone number    Vahid Rush MD [facility] [phone]            See the Encounter Report to view Anticoagulation Flowsheet and Dosing Calendar (Go to Encounters tab in chart review, and find the Anticoagulation Therapy Visit)    Therapeutic INR 2.2. Will continue maintenance dose and recheck in 2 week(s).    Patient states negative for signs and symptoms of bleeding or blood clots, changes in medication, changes in diet, any signs of infection or antibiotic use, anything new OTC or herbal medications, any missed or extra doses of the warfarin.    Patient informed of the symptoms to be seen for either by INR nurse or ER.      Su Burciaga, MARKO

## 2019-05-09 ENCOUNTER — ANTICOAGULATION THERAPY VISIT (OUTPATIENT)
Dept: NURSING | Facility: CLINIC | Age: 34
End: 2019-05-09
Payer: COMMERCIAL

## 2019-05-09 DIAGNOSIS — D68.61 ANTI-PHOSPHOLIPID ANTIBODY SYNDROME (H): ICD-10-CM

## 2019-05-09 LAB — INR POINT OF CARE: 1.8 (ref 0.86–1.14)

## 2019-05-09 PROCEDURE — 99207 ZZC NO CHARGE NURSE ONLY: CPT

## 2019-05-09 PROCEDURE — 85610 PROTHROMBIN TIME: CPT | Mod: QW

## 2019-05-09 PROCEDURE — 36416 COLLJ CAPILLARY BLOOD SPEC: CPT

## 2019-05-09 NOTE — PROGRESS NOTES
ANTICOAGULATION FOLLOW-UP CLINIC VISIT    Patient Name:  Jorge Vuong  Date:  2019  Contact Type:  Face to Face    SUBJECTIVE:  Patient Findings     Positives:   Missed doses (Missed a 10 mg dose yesterday)             OBJECTIVE    INR Protime   Date Value Ref Range Status   2019 1.8 (A) 0.86 - 1.14 Final     Factor 2 Assay   Date Value Ref Range Status   10/07/2009 25 (L) 60 - 140 % Final       ASSESSMENT / PLAN  INR assessment SUB    Recheck INR In: 8 DAYS    INR Location Clinic      Anticoagulation Summary  As of 2019    INR goal:   2.0-3.0   TTR:   0.0 % (1.4 wk)   INR used for dosin.8! (2019)   Warfarin maintenance plan:   12.5 mg (5 mg x 2.5) every Tue; 10 mg (5 mg x 2) all other days   Full warfarin instructions:   12.5 mg every Tue; 10 mg all other days   Weekly warfarin total:   72.5 mg   No change documented:   Sharyn Marcano RN   Plan last modified:   Su Burciaga RN (2019)   Next INR check:   2019   Target end date:       Indications    Anti-phospholipid antibody syndrome (H) [D68.61]             Anticoagulation Episode Summary     INR check location:   Anticoagulation Clinic    Preferred lab:       Send INR reminders to:   [facility]    Comments:         Anticoagulation Care Providers     Provider Role Specialty Phone number    Vahid Rush MD [facility] [phone]            See the Encounter Report to view Anticoagulation Flowsheet and Dosing Calendar (Go to Encounters tab in chart review, and find the Anticoagulation Therapy Visit)    Patient states he missed his warfarin dose yesterday. Patient to resume his maintenance warfarin dosing and have INR rechecked in 1 week.   Denies any changes to medications or other diet issues. Denies bleeding, bruising, or blood clots. Verbalizes understanding of dosing and recheck date.   Patient is aware if signs of clotting (pain, tenderness, swelling, color change in leg or arm, SOB) and  bleeding occur (blood in stool, urine, large bruising, bleeding gums, nosebleeds) to have INR check sooner. If sx severe report to ER or concerned for stroke call 911. If general questions or concerns arise, call clinic.         Sharyn Marcano RN, BSN

## 2019-05-17 ENCOUNTER — ANTICOAGULATION THERAPY VISIT (OUTPATIENT)
Dept: NURSING | Facility: CLINIC | Age: 34
End: 2019-05-17
Payer: COMMERCIAL

## 2019-05-17 DIAGNOSIS — D68.61 ANTI-PHOSPHOLIPID ANTIBODY SYNDROME (H): ICD-10-CM

## 2019-05-17 LAB — INR POINT OF CARE: 2 (ref 0.86–1.14)

## 2019-05-17 PROCEDURE — 99207 ZZC NO CHARGE NURSE ONLY: CPT

## 2019-05-17 PROCEDURE — 85610 PROTHROMBIN TIME: CPT | Mod: QW

## 2019-05-17 PROCEDURE — 36416 COLLJ CAPILLARY BLOOD SPEC: CPT

## 2019-05-17 NOTE — PROGRESS NOTES
ANTICOAGULATION FOLLOW-UP CLINIC VISIT    Patient Name:  Jorge Vuong  Date:  2019  Contact Type:  Face to Face    SUBJECTIVE:  Patient Findings         Clinical Outcomes     Negatives:   Major bleeding event, Thromboembolic event, Anticoagulation-related hospital admission, Anticoagulation-related ED visit, Anticoagulation-related fatality           OBJECTIVE    INR Protime   Date Value Ref Range Status   2019 2.0 (A) 0.86 - 1.14 Final     Factor 2 Assay   Date Value Ref Range Status   10/07/2009 25 (L) 60 - 140 % Final       ASSESSMENT / PLAN  INR assessment THER    Recheck INR In: 3 WEEKS    INR Location Clinic      Anticoagulation Summary  As of 2019    INR goal:   2.0-3.0   TTR:   0.0 % (2.6 wk)   INR used for dosin.0 (2019)   Warfarin maintenance plan:   12.5 mg (5 mg x 2.5) every Tue; 10 mg (5 mg x 2) all other days   Full warfarin instructions:   12.5 mg every Tue; 10 mg all other days   Weekly warfarin total:   72.5 mg   No change documented:   Jessi Proctor RN   Plan last modified:   Su Burciaga RN (2019)   Next INR check:      Target end date:       Indications    Anti-phospholipid antibody syndrome (H) [D68.61]             Anticoagulation Episode Summary     INR check location:   Anticoagulation Clinic    Preferred lab:       Send INR reminders to:    [facility]    Comments:         Anticoagulation Care Providers     Provider Role Specialty Phone number    Vahid Rush MD [facility] [phone]            See the Encounter Report to view Anticoagulation Flowsheet and Dosing Calendar (Go to Encounters tab in chart review, and find the Anticoagulation Therapy Visit)     The patient was assessed for diet, medication, and activity level changes, missed or extra doses, bruising or bleeding, with no problem findings.      Jessi Proctor RN, BSN, PHN

## 2019-06-07 ENCOUNTER — ANTICOAGULATION THERAPY VISIT (OUTPATIENT)
Dept: NURSING | Facility: CLINIC | Age: 34
End: 2019-06-07
Payer: COMMERCIAL

## 2019-06-07 DIAGNOSIS — D68.61 ANTI-PHOSPHOLIPID ANTIBODY SYNDROME (H): ICD-10-CM

## 2019-06-07 LAB — INR POINT OF CARE: 2.1 (ref 0.86–1.14)

## 2019-06-07 PROCEDURE — 36416 COLLJ CAPILLARY BLOOD SPEC: CPT

## 2019-06-07 PROCEDURE — 99207 ZZC NO CHARGE NURSE ONLY: CPT

## 2019-06-07 PROCEDURE — 85610 PROTHROMBIN TIME: CPT | Mod: QW

## 2019-06-07 NOTE — PROGRESS NOTES
ANTICOAGULATION FOLLOW-UP CLINIC VISIT    Patient Name:  Jorge Vuong  Date:  2019  Contact Type:  Face to Face    SUBJECTIVE:  Patient Findings     Positives:   Missed doses ( miss so took 15 mg on Monday)        Clinical Outcomes     Negatives:   Major bleeding event, Thromboembolic event, Anticoagulation-related hospital admission, Anticoagulation-related ED visit, Anticoagulation-related fatality           OBJECTIVE    INR Protime   Date Value Ref Range Status   2019 2.1 (A) 0.86 - 1.14 Final     Factor 2 Assay   Date Value Ref Range Status   10/07/2009 25 (L) 60 - 140 % Final       ASSESSMENT / PLAN  INR assessment THER    Recheck INR In: 4 WEEKS    INR Location Clinic      Anticoagulation Summary  As of 2019    INR goal:   2.0-3.0   TTR:   53.8 % (1.3 mo)   INR used for dosin.1 (2019)   Warfarin maintenance plan:   12.5 mg (5 mg x 2.5) every Tue; 10 mg (5 mg x 2) all other days   Full warfarin instructions:   12.5 mg every Tue; 10 mg all other days   Weekly warfarin total:   72.5 mg   No change documented:   Su Burciaga RN   Plan last modified:   Su Burciaga RN (2019)   Next INR check:   2019   Target end date:       Indications    Anti-phospholipid antibody syndrome (H) [D68.61]             Anticoagulation Episode Summary     INR check location:   Anticoagulation Clinic    Preferred lab:       Send INR reminders to:   [facility]    Comments:         Anticoagulation Care Providers     Provider Role Specialty Phone number    Vahid Rush MD [facility] [phone]            See the Encounter Report to view Anticoagulation Flowsheet and Dosing Calendar (Go to Encounters tab in chart review, and find the Anticoagulation Therapy Visit)    Therapeutic INR 2.1. Will continue maintenance dose and recheck in 4 week(s).    Patient states negative for signs and symptoms of bleeding or blood clots, changes in medication, changes in diet,  any signs of infection or antibiotic use, anything new OTC or herbal medications, any missed or extra doses of the warfarin.    Patient informed of the symptoms to be seen for either by INR nurse or ER.        Su Burciaga RN

## 2019-07-16 ENCOUNTER — TELEPHONE (OUTPATIENT)
Dept: FAMILY MEDICINE | Facility: CLINIC | Age: 34
End: 2019-07-16

## 2019-07-16 NOTE — LETTER
August 7, 2019      Jorge Vuong  [address]  [place] [address]      Dear Jorge,    We are concerned about your Warfarin Therapy.  You have failed to have the necessary laboratory work required for monitoring your warfarin therapy and you have not responded to the Missed Appointment Letter we recently sent you.    We value you as a patient and seek to provide you with excellent quality of care.  We need to be notified if you have changed physicians or if you are being monitored elsewhere so we can update our files.    If we do not hear from you within 14 days of the date of this letter, we will assume that you no longer are requesting our services and will inactivate you from our warfarin monitoring service.  For any further laboratory work and warfarin dose adjustments you will need to contact your care provider for anticoagulation management.    We want to remind you that there are potential serious risks involved in stopping your warfarin or taking warfarin without careful monitoring.    Please contact our office at [phone] with any information or questions you may have.    Sincerely,        The INR Care Team - Dejah Darling

## 2019-07-16 NOTE — LETTER
July 23, 2019      Jorge Vuong  [address]  [place] [address]      Dear Jorge,    We are contacting you because our records show you were due for an INR on 7/5/19.      There are potentially serious risks when taking warfarin without careful monitoring, and we want to make sure you are safely managed.    Please call the INR clinic at [phone] and we will be happy to help you schedule an appointment.  If there has been a change in your care, or other concerns, please let us know so we can help and/or update our records.    Sincerely,        The [facility] INR Care Team

## 2019-07-16 NOTE — TELEPHONE ENCOUNTER
This writer attempted to contact Jorge on 07/16/19      Reason for call schedule INR appointment and unable to leave message - voicemail box is not set up. No other phone numbers listed, patient is not active on MyChart.      If patient calls back:   Schedule Nurse Only appointment within 1 week with INR, document that pt called and close encounter         VINOD JoN, RN, PHN

## 2019-07-23 NOTE — TELEPHONE ENCOUNTER
A letter has been created and placed in outgoing mail basket today. Letter discusses importance of scheduling/attending INR appointments and having INR checked regularly. Scheduling number also provided in letter. Will postpone this encounter for two weeks and if patient does not have an appointment scheduled or has returned a telephone call, then a certified letter will need to be sent to patient.     Sharyn Quinones, VINODN, RN, PHN

## 2019-08-07 NOTE — TELEPHONE ENCOUNTER
A certified letter has been created and placed in outgoing mail basket today. Letter discusses importance of scheduling/attending INR appointments and having INR checked regularly. Scheduling number also provided in letter. Will postpone this encounter for two weeks and if patient does not have an appointment scheduled or has returned a telephone call with route to provider with update and potential discharge from [facility] ACC program.     Sharyn Quinones, BSN, RN, PHN

## 2019-08-21 ENCOUNTER — ANTICOAGULATION THERAPY VISIT (OUTPATIENT)
Dept: NURSING | Facility: CLINIC | Age: 34
End: 2019-08-21

## 2019-08-21 DIAGNOSIS — D68.61 ANTI-PHOSPHOLIPID ANTIBODY SYNDROME (H): ICD-10-CM

## 2019-08-21 NOTE — PROGRESS NOTES
Due to non-compliance with ACC clinic, patient has been discharged.     Per Dr. Rush:    Since patient non-compliant, okay to discharge from ACC program.     MD Jessi Loja RN, BSN, PHN

## 2020-07-23 ENCOUNTER — ANCILLARY PROCEDURE (OUTPATIENT)
Dept: GENERAL RADIOLOGY | Facility: CLINIC | Age: 35
End: 2020-07-23
Attending: INTERNAL MEDICINE
Payer: COMMERCIAL

## 2020-07-23 ENCOUNTER — OFFICE VISIT (OUTPATIENT)
Dept: FAMILY MEDICINE | Facility: CLINIC | Age: 35
End: 2020-07-23
Payer: COMMERCIAL

## 2020-07-23 VITALS
RESPIRATION RATE: 20 BRPM | WEIGHT: 230.2 LBS | TEMPERATURE: 98.4 F | SYSTOLIC BLOOD PRESSURE: 132 MMHG | DIASTOLIC BLOOD PRESSURE: 75 MMHG | BODY MASS INDEX: 29.56 KG/M2 | HEART RATE: 63 BPM | OXYGEN SATURATION: 97 %

## 2020-07-23 DIAGNOSIS — M23.92 INTERNAL DERANGEMENT OF KNEE, LEFT: ICD-10-CM

## 2020-07-23 DIAGNOSIS — D68.61 ANTIPHOSPHOLIPID ANTIBODY SYNDROME (H): Primary | ICD-10-CM

## 2020-07-23 DIAGNOSIS — M43.9 CURVATURE OF THORACIC SPINE: ICD-10-CM

## 2020-07-23 DIAGNOSIS — I81 PORTAL VEIN THROMBOSIS: ICD-10-CM

## 2020-07-23 LAB
ALBUMIN SERPL-MCNC: 4.5 G/DL (ref 3.4–5)
ALP SERPL-CCNC: 83 U/L (ref 40–150)
ALT SERPL W P-5'-P-CCNC: 35 U/L (ref 0–70)
AST SERPL W P-5'-P-CCNC: 23 U/L (ref 0–45)
BILIRUB DIRECT SERPL-MCNC: 0.2 MG/DL (ref 0–0.2)
BILIRUB SERPL-MCNC: 0.8 MG/DL (ref 0.2–1.3)
PROT SERPL-MCNC: 7.5 G/DL (ref 6.8–8.8)

## 2020-07-23 PROCEDURE — 85732 THROMBOPLASTIN TIME PARTIAL: CPT | Performed by: INTERNAL MEDICINE

## 2020-07-23 PROCEDURE — 73562 X-RAY EXAM OF KNEE 3: CPT | Mod: LT

## 2020-07-23 PROCEDURE — 72080 X-RAY EXAM THORACOLMB 2/> VW: CPT

## 2020-07-23 PROCEDURE — 85597 PHOSPHOLIPID PLTLT NEUTRALIZ: CPT | Performed by: INTERNAL MEDICINE

## 2020-07-23 PROCEDURE — 99214 OFFICE O/P EST MOD 30 MIN: CPT | Performed by: INTERNAL MEDICINE

## 2020-07-23 PROCEDURE — 80076 HEPATIC FUNCTION PANEL: CPT | Performed by: INTERNAL MEDICINE

## 2020-07-23 PROCEDURE — 00000167 ZZHCL STATISTIC INR NC: Performed by: INTERNAL MEDICINE

## 2020-07-23 PROCEDURE — 86147 CARDIOLIPIN ANTIBODY EA IG: CPT | Performed by: INTERNAL MEDICINE

## 2020-07-23 PROCEDURE — 00000401 ZZHCL STATISTIC THROMBIN TIME NC: Performed by: INTERNAL MEDICINE

## 2020-07-23 PROCEDURE — 86146 BETA-2 GLYCOPROTEIN ANTIBODY: CPT | Mod: 59 | Performed by: INTERNAL MEDICINE

## 2020-07-23 PROCEDURE — 85730 THROMBOPLASTIN TIME PARTIAL: CPT | Performed by: INTERNAL MEDICINE

## 2020-07-23 PROCEDURE — 86147 CARDIOLIPIN ANTIBODY EA IG: CPT | Mod: 59 | Performed by: INTERNAL MEDICINE

## 2020-07-23 PROCEDURE — 85613 RUSSELL VIPER VENOM DILUTED: CPT | Performed by: INTERNAL MEDICINE

## 2020-07-23 PROCEDURE — 36415 COLL VENOUS BLD VENIPUNCTURE: CPT | Performed by: INTERNAL MEDICINE

## 2020-07-23 PROCEDURE — 82565 ASSAY OF CREATININE: CPT | Performed by: INTERNAL MEDICINE

## 2020-07-23 PROCEDURE — 86146 BETA-2 GLYCOPROTEIN ANTIBODY: CPT | Performed by: INTERNAL MEDICINE

## 2020-07-23 ASSESSMENT — PAIN SCALES - GENERAL: PAINLEVEL: MODERATE PAIN (4)

## 2020-07-23 NOTE — LETTER
July 27, 2020      Jorge Vuong  [address]  [place] [address]        Dear ,    We are writing to inform you of your test results.    One of the three tests for antiphospholipid syndrome is negative. Liver function is normal. Other test results are still pending.     Resulted Orders   Cardiolipin Zulma IgG and IgM   Result Value Ref Range    Cardiolipin Antibody IgG <1.6 0.0 - 19.9 GPL-U/mL      Comment:      Negative    Cardiolipin Antibody IgM 1.0 0.0 - 19.9 MPL-U/mL      Comment:      Negative   Hepatic panel (Albumin, ALT, AST, Bili, Alk Phos, TP)   Result Value Ref Range    Bilirubin Direct 0.2 0.0 - 0.2 mg/dL    Bilirubin Total 0.8 0.2 - 1.3 mg/dL    Albumin 4.5 3.4 - 5.0 g/dL    Protein Total 7.5 6.8 - 8.8 g/dL    Alkaline Phosphatase 83 40 - 150 U/L    ALT 35 0 - 70 U/L    AST 23 0 - 45 U/L       If you have any questions or concerns, please call the clinic at the number listed above.       Sincerely,    Keron Johnson MD/ml

## 2020-07-23 NOTE — PROGRESS NOTES
Subjective     Jorge Vuong is a 34 year old male who presents to clinic today for the following health issues:    HPI     Medication Followup of Warfarin    Taking Medication as prescribed: NO- haven't started     Side Effects:  Has not started     Medication Helping Symptoms:  not applicable    Pt states he hasn't started warfarin and has a busy work schedule and can't come in for INR follow up. Would like to discuss alternative treatment plan           Back Pain       Duration: ongoing for a year but has gradually gotten worse over the last few months         Specific cause: none    Description:   Location of pain: middle of back right  Character of pain: dull ache  Pain radiation: radiates to shoulder blades and right side of rib cage   New numbness or weakness in legs, not attributed to pain:  no     Intensity: Currently 4/10    History:   Pain interferes with job: No  History of back problems: recurrent self limited episodes of low back pain in the past  Any previous MRI or X-rays: None  Sees a specialist for back pain:  No  Therapies tried without relief: cold, heat, massage and NSAIDs    Alleviating factors:   Improved by: none      Precipitating factors:  Worsened by: Lifting and Bending    Accompanying Signs & Symptoms:  Risk of Fracture:  None  Risk of Cauda Equina:  None  Risk of Infection:  None  Risk of Cancer:  None  Risk of Ankylosing Spondylitis:  Onset at age <35, male, AND morning back stiffness. no       Knee Pain    Onset: has had knee pain since childhood    Description:   Location: left knee  Character: Dull ache    Intensity: moderate    Progression of Symptoms: worse    Accompanying Signs & Symptoms:  Other symptoms: pt states some swelling from time to time, has fallen down the stairs twice due to knee giving out       History:   Previous similar pain: no       Precipitating factors:   Trauma or overuse: no     Alleviating factors:  Improved by: nothing    Therapies Tried and outcome:  taking over the counter pain meds with some relief              Patient Active Problem List   Diagnosis     Anti-phospholipid antibody syndrome (H)     Arthralgia     CARDIOVASCULAR SCREENING; LDL GOAL LESS THAN 160     Anxiety     DVT (deep venous thrombosis) (H)     Long term current use of anticoagulant therapy     Long-term (current) use of anticoagulants [Z79.01]     Past Surgical History:   Procedure Laterality Date     C APPENDECTOMY  1/2009     C INSERT ABDOMEN-VENOUS SHUNT  8-27-09    spleno/renal shunt placed       Social History     Tobacco Use     Smoking status: Current Some Day Smoker     Years: 4.00     Smokeless tobacco: Never Used     Tobacco comment: 1-3 cigs per month   Substance Use Topics     Alcohol use: No     Family History   Problem Relation Age of Onset     Arthritis Mother         rheumatoid arthritis     Cancer Maternal Grandmother         brain      Heart Disease Maternal Grandfather      Psychotic Disorder Paternal Grandmother         depression     Depression Paternal Grandmother      Thyroid Disease Sister      Depression Sister      Mental Illness Sister      Depression Father      Substance Abuse Father      Suicide Maternal Uncle      Schizophrenia Maternal Uncle      Depression Other      Anxiety Disorder Other          Allergies   Allergen Reactions     No Known Allergies      Recent Labs   Lab Test 07/23/20  1508 11/15/17  0748 12/31/12  0848   LDL  --  77  --    HDL  --  42  --    TRIG  --  63  --    ALT 35 23 33   CR 0.97 1.03 0.85   GFRESTIMATED >90 84 >90   GFRESTBLACK >90 >90 >90   POTASSIUM  --  3.8 3.9      BP Readings from Last 3 Encounters:   07/23/20 132/75   04/09/19 125/66   04/01/19 150/77    Wt Readings from Last 3 Encounters:   07/23/20 104.4 kg (230 lb 3.2 oz)   04/09/19 108.9 kg (240 lb)   04/01/19 108.9 kg (240 lb)                    Reviewed and updated as needed this visit by Provider         Review of Systems   CONSTITUTIONAL: NEGATIVE for fever, chills,  change in weight  INTEGUMENTARY/SKIN: NEGATIVE for worrisome rashes, moles or lesions  EYES: NEGATIVE for vision changes or irritation  ENT/MOUTH: NEGATIVE for ear, mouth and throat problems  RESP: NEGATIVE for significant cough or SOB  CV: NEGATIVE for chest pain, palpitations or peripheral edema  GI: NEGATIVE for nausea, abdominal pain, heartburn, or change in bowel habits  : NEGATIVE for frequency, dysuria, or hematuria  MUSCULOSKELETAL: NEGATIVE for significant arthralgias or myalgia  NEURO: NEGATIVE for weakness, dizziness or paresthesias  ENDOCRINE: NEGATIVE for temperature intolerance, skin/hair changes  HEME: NEGATIVE for bleeding problems  PSYCHIATRIC: NEGATIVE for changes in mood or affect      Objective    /75 (BP Location: Left arm, Patient Position: Chair, Cuff Size: Adult Regular)   Pulse 63   Temp 98.4  F (36.9  C) (Tympanic)   Resp 20   Wt 104.4 kg (230 lb 3.2 oz)   SpO2 97%   BMI 29.56 kg/m    Body mass index is 29.56 kg/m .  Physical Exam   GENERAL: healthy, alert and no distress  EYES: Eyes grossly normal to inspection, PERRL and conjunctivae and sclerae normal  HENT: ear canals and TM's normal, nose and mouth without ulcers or lesions  NECK: no adenopathy, no asymmetry, masses, or scars and thyroid normal to palpation  RESP: lungs clear to auscultation - no rales, rhonchi or wheezes  CV: regular rate and rhythm, normal S1 S2, no S3 or S4, no murmur, click or rub, no peripheral edema and peripheral pulses strong  ABDOMEN: soft, nontender, no hepatosplenomegaly, no masses and bowel sounds normal  MS: no gross musculoskeletal defects noted, no edema  SKIN: no suspicious lesions or rashes  NEURO: Normal strength and tone, mentation intact and speech normal  PSYCH: mentation appears normal, affect normal/bright    Diagnostic Test Results:  Results for orders placed or performed in visit on 07/23/20   XR Thoracic Lumbar Spine 2 Views     Status: None    Narrative    THORACIC LUMBAR SPINE  TWO VIEWS   7/23/2020 3:16 PM     HISTORY: Curvature of thoracic spine    COMPARISON: None.      Impression    IMPRESSION: Moderate degenerative change is present. Slight wedging of  multiple lower thoracic vertebral bodies, probably chronic. Alignment  is significant for trace levoconvex curvature of the thoracolumbar  junction. Epigastric surgical clips are present.    GAVIN CHRISTINE MD   XR Knee Left 3 Views     Status: None    Narrative    KNEE LEFT THREE VIEWS    7/23/2020 3:17 PM     HISTORY: Left knee pain.    COMPARISON: None.      Impression    IMPRESSION: No evidence of acute fracture or subluxation. Probable  joint effusion. Joint spaces are well-preserved.    SOPHIE ACE MD   Lupus Anticoagulant Panel     Status: None   Result Value Ref Range    Lupus Result Negative NEG^Negative   Cardiolipin Zulma IgG and IgM     Status: None   Result Value Ref Range    Cardiolipin Antibody IgG <1.6 0.0 - 19.9 GPL-U/mL    Cardiolipin Antibody IgM 1.0 0.0 - 19.9 MPL-U/mL   Beta 2 Glycoprotein Antibodies IGG IGM     Status: None   Result Value Ref Range    Beta 2 Glycoprotein 1 Antibody IgG 0.8 <7 U/mL    Beta 2 Glycoprotein 1 Antibody IgM <2.9 <7 U/mL   Hepatic panel (Albumin, ALT, AST, Bili, Alk Phos, TP)     Status: None   Result Value Ref Range    Bilirubin Direct 0.2 0.0 - 0.2 mg/dL    Bilirubin Total 0.8 0.2 - 1.3 mg/dL    Albumin 4.5 3.4 - 5.0 g/dL    Protein Total 7.5 6.8 - 8.8 g/dL    Alkaline Phosphatase 83 40 - 150 U/L    ALT 35 0 - 70 U/L    AST 23 0 - 45 U/L   Creatinine     Status: None   Result Value Ref Range    Creatinine 0.97 0.66 - 1.25 mg/dL    GFR Estimate >90 >60 mL/min/[1.73_m2]    GFR Estimate If Black >90 >60 mL/min/[1.73_m2]           Assessment & Plan     1. Antiphospholipid antibody syndrome (H)    - Lupus Anticoagulant Panel  - Cardiolipin Zulma IgG and IgM  - Beta 2 Glycoprotein Antibodies IGG IGM  - Hepatic panel (Albumin, ALT, AST, Bili, Alk Phos, TP)  - Creatinine    2. Curvature of  thoracic spine    - XR Thoracic Lumbar Spine 2 Views    3. Internal derangement of knee, left    - MR Knee Left w/o Contrast; Future  - XR Knee Left 3 Views    4. History of portal vein thrombosis    - US Abdomen Limited; Future     Tobacco Cessation:   reports that he has been smoking. He has smoked for the past 4.00 years. He has never used smokeless tobacco.  Tobacco Cessation Action Plan: Self help information given to patient        FUTURE APPOINTMENTS:       - Follow-up visit in 4 weeks.      Keron Johnson MD  [facility]

## 2020-07-24 LAB
CARDIOLIPIN ANTIBODY IGG: <1.6 GPL-U/ML (ref 0–19.9)
CARDIOLIPIN ANTIBODY IGM: 1 MPL-U/ML (ref 0–19.9)

## 2020-07-27 ENCOUNTER — TELEPHONE (OUTPATIENT)
Dept: FAMILY MEDICINE | Facility: CLINIC | Age: 35
End: 2020-07-27

## 2020-07-27 LAB
B2 GLYCOPROT1 IGG SERPL IA-ACNC: 0.8 U/ML
B2 GLYCOPROT1 IGM SERPL IA-ACNC: <2.9 U/ML
CREAT SERPL-MCNC: 0.97 MG/DL (ref 0.66–1.25)
GFR SERPL CREATININE-BSD FRML MDRD: >90 ML/MIN/{1.73_M2}
LA PPP-IMP: NEGATIVE

## 2020-07-27 NOTE — TELEPHONE ENCOUNTER
This writer attempted to contact Jorge on 07/27/20      Reason for call results and not able to leave message as no VM set up.      If patient calls back:   Registered Nurse called. Follow Triage Call workflow        Lillie Ervin RN

## 2020-07-28 NOTE — TELEPHONE ENCOUNTER
This writer attempted to contact Jorge on 07/28/20      Reason for call results and unable to leave message. - Voicemail box not set up.      If patient calls back:   Registered Nurse called. Follow Triage Call workflow        Isis Sandoval RN

## 2020-07-29 NOTE — TELEPHONE ENCOUNTER
3 unsuccessful attempts at trying to reach patient via phone to review results.  Requesting to have letter sent.    Lillie Ervin RN    This writer attempted to contact Jorge on 07/29/20      Reason for call results and unable to leave message.-VM not set up      If patient calls back:   Registered Nurse called. Follow Triage Call workflow        Lillie Ervin, RN

## 2020-08-17 ENCOUNTER — ANCILLARY PROCEDURE (OUTPATIENT)
Dept: MRI IMAGING | Facility: CLINIC | Age: 35
End: 2020-08-17
Attending: INTERNAL MEDICINE
Payer: COMMERCIAL

## 2020-08-17 DIAGNOSIS — M23.92 INTERNAL DERANGEMENT OF KNEE, LEFT: ICD-10-CM

## 2020-08-17 PROCEDURE — 73721 MRI JNT OF LWR EXTRE W/O DYE: CPT | Mod: LT | Performed by: RADIOLOGY

## 2020-09-15 PROBLEM — M22.2X2 PATELLOFEMORAL SYNDROME OF LEFT KNEE: Status: ACTIVE | Noted: 2020-09-15

## 2020-09-18 NOTE — PROGRESS NOTES
SUBJECTIVE:   Jorge Vuong is a 34 year old male who is seen in consultation at the request of Dr. Johnson for evaluation of left knee pain.      Onset: Onset: has had knee pain since childhood    Description:   Location: left knee  Character: Dull ache    Intensity: moderate    Progression of Symptoms: worse    Accompanying Signs & Symptoms:  Other symptoms: pt states some swelling from time to time, has fallen down the stairs twice due to knee giving out       History:   Previous similar pain: no       Precipitating factors:   Trauma or overuse: no     Alleviating factors:  Improved by: nothing     Therapies Tried and outcome: taking over the counter pain meds with some relief.    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  Problems worsened the past 6-7 months, but chronic mild issues   + catching, clunking.  Pain going up stairs or hills-- pain anteriorly  Occasional swelling.  Pain if sits too long, then starts to move it.    Treatments tried to this point: ice, activity modification    Past Medical History:   Past Medical History:   Diagnosis Date     APS (antiphospholipid syndrome) (H)  In remission for now.  Not on anticoagulants presently      Past Surgical History:   Past Surgical History:   Procedure Laterality Date     C APPENDECTOMY  1/2009     C INSERT ABDOMEN-VENOUS SHUNT  8-27-09    spleno/renal shunt placed     Current Outpatient Medications   Medication     ASPIRIN PO     diclofenac (VOLTAREN) 1 % topical gel     naproxen sodium (ANAPROX) 220 MG tablet     No current facility-administered medications for this visit.        Family History:   Family History   Problem Relation Age of Onset     Arthritis Mother         rheumatoid arthritis     Cancer Maternal Grandmother         brain      Heart Disease Maternal Grandfather      Psychotic Disorder Paternal Grandmother         depression     Depression Paternal Grandmother      Thyroid Disease  "Sister      Depression Sister      Mental Illness Sister      Depression Father      Substance Abuse Father      Suicide Maternal Uncle      Schizophrenia Maternal Uncle      Depression Other      Anxiety Disorder Other      Social History:   Social History     Tobacco Use     Smoking status: Current Some Day Smoker     Years: 4.00     Smokeless tobacco: Never Used     Tobacco comment: 1-3 cigs per month   Substance Use Topics     Alcohol use: No       Review of Systems:  Constitutional:  NEGATIVE for fever, chills, change in weight  Integumentary/Skin:  NEGATIVE for worrisome rashes, moles or lesions  Eyes:  NEGATIVE for vision changes or irritation  ENT/Mouth:  NEGATIVE for ear, mouth and throat problems  Resp:  NEGATIVE for significant cough or SOB  Breast:  NEGATIVE for masses, tenderness or discharge  CV:  NEGATIVE for chest pain, palpitations or peripheral edema  GI:  NEGATIVE for nausea, abdominal pain, heartburn, or change in bowel habits  :  Negative   Musculoskeletal:  See HPI above  Neuro:  NEGATIVE for weakness, dizziness or paresthesias  Endocrine:  NEGATIVE for temperature intolerance, skin/hair changes  Heme/allergy/immune:  NEGATIVE for bleeding problems  Psychiatric:  NEGATIVE for changes in mood or affect      OBJECTIVE:  Physical Exam:  /76 (BP Location: Left arm, Patient Position: Sitting, Cuff Size: Adult Regular)   Pulse 67   Ht 1.88 m (6' 2\")   Wt 104.3 kg (230 lb)   SpO2 96%   BMI 29.53 kg/m    General Appearance: healthy, alert and no distress   Skin: no suspicious lesions or rashes  Neuro: Normal strength and tone, mentation intact and speech normal  Vascular: good pulses, and cappillary refill   Lymph: no lymphadenopathy   Psych:  mentation appears normal and affect normal/bright  Resp: no increased work of breathing     Left Knee Exam:  Gait: walks with normal gait  Alignment: normal   Squat: 100 % pain getting up.    Patellofemoral joint: mild crepitations in the " patellofemoral joint.  Effusion: none  ROM: full  Tender: anteromedial and anterolateral  Masses: none  Ligaments:   Lachman's: stable   Anterior/Posterior drawer: stable,   Varus/Valgus stress: stable to varus and valgus stress  McMurrays: negative  Mild pain when bouncing into full extension.    X-rays:  Obtained 7/23/20, reviewed in the office with the patient today:    No evidence of acute fracture or subluxation. Probable  joint effusion. Joint spaces are well-preserved.       MRI:    From 8/17/20 reviewed in the office with the patient today show: Impression:  1. Mild free edge fraying of the junction of the medial meniscal body  and posterior horn.     2. Grade 1-2 superficial tibial collateral ligament (MCL) sprain.     3. Grade 4 patellofemoral chondromalacia. Mainly small focal areas on trochlea and lateral patella to my view.   Focal grade III  chondromalacia of the medial femoral condyle likely with a component  of focal delamination.     4. Small joint effusion.       ASSESSMENT:   Patellofemoral joint osteoarthritis   Possible small medial meniscus tear    PLAN:   Because of his antiphospholipid syndrome, his risk of surgery is higher for DVT,but is not on major anticoagulants,  and maybe he should exhaust non-operative treatment before considering surgery, (arthroscopic patellofemoral chondroplasty, partial medial meniscectomy)  I don't think the symptoms are consistent with the meniscus tear but rather patellofemoral joint osteoarthritis   Mom is therapist and will work on lower extremity strengthening   nsaids as needed  Corticosteroid injection can be done down the road    Return to clinic: as needed     KAYLA Martinez MD  Dept. Orthopedic Surgery  [facility]

## 2020-09-22 ENCOUNTER — OFFICE VISIT (OUTPATIENT)
Dept: ORTHOPEDICS | Facility: CLINIC | Age: 35
End: 2020-09-22
Attending: INTERNAL MEDICINE
Payer: COMMERCIAL

## 2020-09-22 VITALS
HEART RATE: 67 BPM | HEIGHT: 74 IN | BODY MASS INDEX: 29.52 KG/M2 | SYSTOLIC BLOOD PRESSURE: 130 MMHG | OXYGEN SATURATION: 96 % | WEIGHT: 230 LBS | DIASTOLIC BLOOD PRESSURE: 76 MMHG

## 2020-09-22 DIAGNOSIS — S83.242A OTHER TEAR OF MEDIAL MENISCUS OF LEFT KNEE AS CURRENT INJURY, INITIAL ENCOUNTER: ICD-10-CM

## 2020-09-22 DIAGNOSIS — M17.10 PATELLOFEMORAL ARTHRITIS: Primary | ICD-10-CM

## 2020-09-22 DIAGNOSIS — M22.2X2 PATELLOFEMORAL SYNDROME OF LEFT KNEE: ICD-10-CM

## 2020-09-22 PROCEDURE — 99243 OFF/OP CNSLTJ NEW/EST LOW 30: CPT | Performed by: ORTHOPAEDIC SURGERY

## 2020-09-22 ASSESSMENT — MIFFLIN-ST. JEOR: SCORE: 2053.02

## 2020-09-22 NOTE — PATIENT INSTRUCTIONS
Non-surgical treatment for knee arthritis includes:    * rest.  For acute flares. (Periodic).    * Activity modification - avoid impact activities or activities that aggravate symptoms.   Keeping joints moving may be the most important aspect of joint health.    * Tylenol as needed for pain, consider Tylenol arthritis or similar.  (no more than 3000mg of acetaminophen in a 24 hour period)    * NSAIDS (non-steroidal anti-inflammatory medications; e.g. Aleve, advil, motrin, ibuprofen, etc) - regular use for inflammation ( twice daily or three times daily), with food, as long as there are no contra-indications to using these medications. (if you have stomach ulcers, reflux, or kidney dysfunction, you should talk to your primary care provider to discuss whether these medications are right for your).  Please discuss other concerns with your primary care doctor if needed.    *Glucosamine-Chondroitin (1500 mg per day. Available over the counter)  has not been proven to help arthritis, yet some patients claim that it does help them with their arthritis pain.    * ice, 15-20 minutes at a time several times a day or as needed if there is swelling, or after an acute injury.  * Strengthening of quadriceps muscles  * Physical Therapy for strengthening, stretching and range of motion exercises of legs      * Weight loss: Weight loss:  Your body mass index is Body mass index is 29.53 kg/m .. A healthy BMI is below 25.  Weight loss benefits, not only the current pain symptoms, but also overall health. Recommend a good diet plan that works for the patient, with the assistance of a dietician or primary care doctor as needed. Also, a good, low-impact exercise program for at least 20 minutes per day, 3 times per week, such as exercise bike, elliptical , or pool.    *DIET:  Although there is no set diet that will eliminate/cure arthritis, there are some foods that may help inflammation, which is the cause of the pain in  "arthritis.   Check out the Arthritis Foundation website for further diet suggestions to potentially reduce inflammation and pain:    http://www.arthritis.org/living-with-arthritis/arthritis-diet/best-foods-for-arthritis/    Some supplements may help with inflammation, such as concentrated cherry juice, Tumeric, and Fish Oil.    Tumeric with Black pepper is the most effective.    * Exercise: low impact such as stationary bike, elliptical, pool.  Some Knee exercises can be found at the [employer] website:  https://orthoinfo.aaos.org/en/staying-healthy/knee-exercises/     * Injections: cortisone, versus viscosupplementation (hyaluronic acid, \"rooster comb\", \"gel shots\")  * Bracing: bracing the knee may offer some relief of symptoms when worn and provide some stability.  These can be over the counter, or custom-made \"\" braces that take the pressure off of the worn portion of the knee.  * over the counter supplements such as glucosamine-chondroitin sulfate may help with joint pain.  * topical ointments may help as well with pain  *CBD oil has been found to help arthritis pain as well.    *Accupuncture may be helpful to control the pain.    *Surgical options include arthroscopy, osteotomy (correcting the alignment of the bone by cutting it), biologic resurfacing, cadaver partial transplant, partial or total knee replacement.  This should be discussed with Dr. Martinez.                          "

## 2020-09-22 NOTE — LETTER
9/22/2020         RE: Jorge Vuong  [address]  [facility] [address]        Dear Colleague,    Thank you for referring your patient, Jorge Vuong, to the [facility]. Please see a copy of my visit note below.    SUBJECTIVE:   Jorge Vuong is a 34 year old male who is seen in consultation at the request of Dr. Johnson for evaluation of left knee pain.      Onset: Onset: has had knee pain since childhood    Description:   Location: left knee  Character: Dull ache    Intensity: moderate    Progression of Symptoms: worse    Accompanying Signs & Symptoms:  Other symptoms: pt states some swelling from time to time, has fallen down the stairs twice due to knee giving out       History:   Previous similar pain: no       Precipitating factors:   Trauma or overuse: no     Alleviating factors:  Improved by: nothing     Therapies Tried and outcome: taking over the counter pain meds with some relief.    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  Problems worsened the past 6-7 months, but chronic mild issues   + catching, clunking.  Pain going up stairs or hills-- pain anteriorly  Occasional swelling.  Pain if sits too long, then starts to move it.    Treatments tried to this point: ice, activity modification    Past Medical History:   Past Medical History:   Diagnosis Date     APS (antiphospholipid syndrome) (H)  In remission for now.  Not on anticoagulants presently      Past Surgical History:   Past Surgical History:   Procedure Laterality Date     C APPENDECTOMY  1/2009     C INSERT ABDOMEN-VENOUS SHUNT  8-27-09    spleno/renal shunt placed     Current Outpatient Medications   Medication     ASPIRIN PO     diclofenac (VOLTAREN) 1 % topical gel     naproxen sodium (ANAPROX) 220 MG tablet     No current facility-administered medications for this visit.        Family History:   Family History   Problem Relation Age of Onset      "Arthritis Mother         rheumatoid arthritis     Cancer Maternal Grandmother         brain      Heart Disease Maternal Grandfather      Psychotic Disorder Paternal Grandmother         depression     Depression Paternal Grandmother      Thyroid Disease Sister      Depression Sister      Mental Illness Sister      Depression Father      Substance Abuse Father      Suicide Maternal Uncle      Schizophrenia Maternal Uncle      Depression Other      Anxiety Disorder Other      Social History:   Social History     Tobacco Use     Smoking status: Current Some Day Smoker     Years: 4.00     Smokeless tobacco: Never Used     Tobacco comment: 1-3 cigs per month   Substance Use Topics     Alcohol use: No       Review of Systems:  Constitutional:  NEGATIVE for fever, chills, change in weight  Integumentary/Skin:  NEGATIVE for worrisome rashes, moles or lesions  Eyes:  NEGATIVE for vision changes or irritation  ENT/Mouth:  NEGATIVE for ear, mouth and throat problems  Resp:  NEGATIVE for significant cough or SOB  Breast:  NEGATIVE for masses, tenderness or discharge  CV:  NEGATIVE for chest pain, palpitations or peripheral edema  GI:  NEGATIVE for nausea, abdominal pain, heartburn, or change in bowel habits  :  Negative   Musculoskeletal:  See HPI above  Neuro:  NEGATIVE for weakness, dizziness or paresthesias  Endocrine:  NEGATIVE for temperature intolerance, skin/hair changes  Heme/allergy/immune:  NEGATIVE for bleeding problems  Psychiatric:  NEGATIVE for changes in mood or affect      OBJECTIVE:  Physical Exam:  /76 (BP Location: Left arm, Patient Position: Sitting, Cuff Size: Adult Regular)   Pulse 67   Ht 1.88 m (6' 2\")   Wt 104.3 kg (230 lb)   SpO2 96%   BMI 29.53 kg/m    General Appearance: healthy, alert and no distress   Skin: no suspicious lesions or rashes  Neuro: Normal strength and tone, mentation intact and speech normal  Vascular: good pulses, and cappillary refill   Lymph: no lymphadenopathy "   Psych:  mentation appears normal and affect normal/bright  Resp: no increased work of breathing     Left Knee Exam:  Gait: walks with normal gait  Alignment: normal   Squat: 100 % pain getting up.    Patellofemoral joint: mild crepitations in the patellofemoral joint.  Effusion: none  ROM: full  Tender: anteromedial and anterolateral  Masses: none  Ligaments:   Lachman's: stable   Anterior/Posterior drawer: stable,   Varus/Valgus stress: stable to varus and valgus stress  McMurrays: negative  Mild pain when bouncing into full extension.    X-rays:  Obtained 7/23/20, reviewed in the office with the patient today:    No evidence of acute fracture or subluxation. Probable  joint effusion. Joint spaces are well-preserved.       MRI:    From 8/17/20 reviewed in the office with the patient today show: Impression:  1. Mild free edge fraying of the junction of the medial meniscal body  and posterior horn.     2. Grade 1-2 superficial tibial collateral ligament (MCL) sprain.     3. Grade 4 patellofemoral chondromalacia. Mainly small focal areas on trochlea and lateral patella to my view.   Focal grade III  chondromalacia of the medial femoral condyle likely with a component  of focal delamination.     4. Small joint effusion.       ASSESSMENT:   Patellofemoral joint osteoarthritis   Possible small medial meniscus tear    PLAN:   Because of his antiphospholipid syndrome, his risk of surgery is higher for DVT,but is not on major anticoagulants,  and maybe he should exhaust non-operative treatment before considering surgery, (arthroscopic patellofemoral chondroplasty, partial medial meniscectomy)  I don't think the symptoms are consistent with the meniscus tear but rather patellofemoral joint osteoarthritis   Mom is therapist and will work on lower extremity strengthening   nsaids as needed  Corticosteroid injection can be done down the road    Return to clinic: as needed     KAYLA Martinez MD  Dept. Orthopedic  Surgery  [facility]     Again, thank you for allowing me to participate in the care of your patient.        Sincerely,        Josiah Martinez MD

## 2020-12-03 ENCOUNTER — OFFICE VISIT (OUTPATIENT)
Dept: FAMILY MEDICINE | Facility: CLINIC | Age: 35
End: 2020-12-03
Payer: COMMERCIAL

## 2020-12-03 VITALS
HEART RATE: 64 BPM | HEIGHT: 74 IN | RESPIRATION RATE: 18 BRPM | DIASTOLIC BLOOD PRESSURE: 70 MMHG | BODY MASS INDEX: 31.06 KG/M2 | TEMPERATURE: 97.3 F | SYSTOLIC BLOOD PRESSURE: 132 MMHG | WEIGHT: 242 LBS | OXYGEN SATURATION: 96 %

## 2020-12-03 DIAGNOSIS — H61.21 IMPACTED CERUMEN OF RIGHT EAR: Primary | ICD-10-CM

## 2020-12-03 PROCEDURE — 69209 REMOVE IMPACTED EAR WAX UNI: CPT | Mod: RT | Performed by: INTERNAL MEDICINE

## 2020-12-03 PROCEDURE — 99207 PR NO CHARGE LOS: CPT | Performed by: INTERNAL MEDICINE

## 2020-12-03 ASSESSMENT — PAIN SCALES - GENERAL: PAINLEVEL: NO PAIN (0)

## 2020-12-03 ASSESSMENT — MIFFLIN-ST. JEOR: SCORE: 2102.45

## 2020-12-03 NOTE — PATIENT INSTRUCTIONS
At [facility], we strive to deliver an exceptional experience to you, every time we see you. If you receive a survey, please complete it as we do value your feedback.  If you have MyChart, you can expect to receive results automatically within 24 hours of their completion.  Your provider will send a note interpreting your results as well.   If you do not have MyChart, you should receive your results in about a week by mail.    Your care team:                            Family Medicine Internal Medicine   MD Keron Schilling MD Shantel Branch-Fleming, MD Srinivasa Vaka, MD Katya Georgiev PA-C Megan Hill, APRN CNP    Vahid Rush, MD Pediatrics   Jaya Henriquez, PAEnrriqueC  Janneth Nicole, CNP MD Daniela Nowak APRN CNP   MD Sirisha Ruiz MD Deborah Mielke, MD Selam Gallardo, APRN CNP  Clau Sheppard, PAEnrriqueC  Seda Deleon, CNP  MD Zuleika Wheeler MD Angela Wermerskirchen, MD      Clinic hours: Monday - Thursday 7 am-7 pm; Fridays 7 am-5 pm.   Urgent care: Monday - Friday 11 am-9 pm; Saturday and Sunday 9 am-5 pm.    Clinic: [phone]       [facility]: Monday - Thursday 8 am - 7 pm; Friday 8 am - 6 pm  [facility]: [phone]     Use [email or web address] for 24/7 diagnosis and treatment of dozens of conditions.

## 2020-12-03 NOTE — PROGRESS NOTES
"Subjective     Jorge Vuong is a 35 year old male who presents to clinic today for the following health issues:    HPI        Concern - Ear  Onset: 3 months  Description: In the last few months felt like the right ear kept on getting plugged on and off.  Intensity: severe  Progression of Symptoms:  Worsening x 3 weeks ears plugged constantly  Accompanying Signs & Symptoms: decreased hearing loss  Previous history of similar problem: yes right ear  Precipitating factors:        Worsened by: higher pitch noise, move ear around causes pressure like pushing something back into ear canal  Alleviating factors:        Improved by: none  Therapies tried and outcome: none    Review of Systems   CONSTITUTIONAL: NEGATIVE for fever, chills, change in weight  INTEGUMENTARY/SKIN: NEGATIVE for worrisome rashes, moles or lesions  EYES: NEGATIVE for vision changes or irritation  ENT/MOUTH: NEGATIVE for epistaxis, fever and vertigo  RESP: NEGATIVE for significant cough or SOB  CV: NEGATIVE for chest pain, palpitations or peripheral edema  GI: NEGATIVE for nausea, abdominal pain, heartburn, or change in bowel habits  : NEGATIVE for frequency, dysuria, or hematuria  MUSCULOSKELETAL: NEGATIVE for significant arthralgias or myalgia  NEURO: NEGATIVE for weakness, dizziness or paresthesias  ENDOCRINE: NEGATIVE for temperature intolerance, skin/hair changes  HEME: NEGATIVE for bleeding problems  PSYCHIATRIC: NEGATIVE for changes in mood or affect      Objective    /70 (BP Location: Left arm, Patient Position: Chair, Cuff Size: Adult Large)   Pulse 64   Temp 97.3  F (36.3  C) (Oral)   Resp 18   Ht 1.88 m (6' 2\")   Wt 109.8 kg (242 lb)   SpO2 96%   BMI 31.07 kg/m    Body mass index is 31.07 kg/m .  Physical Exam   GENERAL: healthy, alert and no distress  EYES: Eyes grossly normal to inspection and visual fields normal  HENT: normal cephalic/atraumatic and impacted cerumen of the right ear.  NECK: no adenopathy  RESP: lungs " "clear to auscultation - no rales, rhonchi or wheezes  CV: regular rate and rhythm, normal S1 S2, no S3 or S4, no murmur, click or rub, no peripheral edema and peripheral pulses strong  MS: no gross musculoskeletal defects noted, no edema  NEURO: Normal strength and tone, mentation intact and speech normal  PSYCH: mentation appears normal, affect normal/bright    DIAGNOSTICS  None        Assessment & Plan   1. Impacted cerumen of right ear  Cause of acute hearing loss. Ear lavage performed without adverse events such as dizziness nor tympanic membrane perforation.     BMI:   Estimated body mass index is 31.07 kg/m  as calculated from the following:    Height as of this encounter: 1.88 m (6' 2\").    Weight as of this encounter: 109.8 kg (242 lb).   Weight management plan: diet and exercise         FUTURE APPOINTMENTS:       - Follow-up visit in 4 weeks.    Keron Johnson MD  [facility]    "

## 2020-12-06 ENCOUNTER — HEALTH MAINTENANCE LETTER (OUTPATIENT)
Age: 35
End: 2020-12-06

## 2021-04-18 ENCOUNTER — OFFICE VISIT (OUTPATIENT)
Dept: URGENT CARE | Facility: URGENT CARE | Age: 36
End: 2021-04-18
Payer: COMMERCIAL

## 2021-04-18 VITALS
WEIGHT: 239.2 LBS | RESPIRATION RATE: 16 BRPM | HEART RATE: 66 BPM | TEMPERATURE: 98.2 F | OXYGEN SATURATION: 97 % | SYSTOLIC BLOOD PRESSURE: 127 MMHG | BODY MASS INDEX: 30.71 KG/M2 | DIASTOLIC BLOOD PRESSURE: 69 MMHG

## 2021-04-18 DIAGNOSIS — S61.212A LACERATION OF RIGHT MIDDLE FINGER WITHOUT FOREIGN BODY WITHOUT DAMAGE TO NAIL, INITIAL ENCOUNTER: Primary | ICD-10-CM

## 2021-04-18 DIAGNOSIS — D68.61 ANTIPHOSPHOLIPID ANTIBODY SYNDROME (H): ICD-10-CM

## 2021-04-18 PROCEDURE — 99214 OFFICE O/P EST MOD 30 MIN: CPT | Performed by: PHYSICIAN ASSISTANT

## 2021-04-18 NOTE — PROGRESS NOTES
Chief Complaint   Patient presents with     Laceration     right middle finger last night              Assessment:    ICD-10-CM    1. Laceration of right middle finger without foreign body without damage to nail, initial encounter  S61.212A    2. Antiphospholipid antibody syndrome (H)  D68.61        PLAN: Too late for suturing this far out as there is high risk for infection.  However the edges already look like they are adhered and healing.  Laceration is soaked in Hibiclens and sterile water.  Bacitracin, Telfa, finger tube gauze, Coban and splint applied.  Wear splint for at least 3 days.  Watch for evidence of infection including fever, increased redness, increased pain, drainage.  Follow-up with primary within the next week. I wonder if there are some compliance issues with his coumadin.          SUBJECTIVE:  35-year-old male presents for laceration right middle finger that occurred at 6:54 PM last night.  He is right-handed.  He was flattening an old mailbox with a hammer and cut his finger on the edge of the mailbox.  He did not hit his finger with a hammer.  Last tetanus shot 2019.  Last it thoroughly and placed topical antibiotic ointment and dressing on it.  History of antiphospholipid antibody syndrome.  He is not on Coumadin at this time, only oral aspirin.    Last tetanus booster within 5 years: yes       Allergies   Allergen Reactions     No Known Allergies        Past Medical History:   Diagnosis Date     APS (antiphospholipid syndrome) (H)        ASPIRIN PO,   diclofenac (VOLTAREN) 1 % topical gel, Apply 2 g topically 4 times daily  naproxen sodium (ANAPROX) 220 MG tablet, Take 220-440 mg by mouth 3 times daily as needed for moderate pain    No current facility-administered medications on file prior to visit.         ROS:  SKIN: as above  Musculoskeletal: as above    OBJECTIVE:  Blood pressure 127/69, pulse 66, temperature 98.2  F (36.8  C), temperature source Oral, resp. rate 16, weight 108.5 kg  (239 lb 3.2 oz), SpO2 97 %.     The patient appears today in no acute distress.  Laceration LOCATION: Right dorsal middle finger PIP joint.   size of laceration: 1 centimeters, the edges are approximated, no gaping.    Characteristics of the laceration: straight  Tendon function intact: yes  Sensation to light touch intact: yes  Pulses intact: yes  Cap refill intact.  Wound clean. No FBs.    Full extension of the right middle finger.  Mild decreased flexion, 1 cm from palm due to mild swelling around the PIP joint/laceration.    Ana M Angel PA-C

## 2021-09-26 ENCOUNTER — HEALTH MAINTENANCE LETTER (OUTPATIENT)
Age: 36
End: 2021-09-26

## 2022-01-15 ENCOUNTER — HEALTH MAINTENANCE LETTER (OUTPATIENT)
Age: 37
End: 2022-01-15

## 2022-10-12 ENCOUNTER — OFFICE VISIT (OUTPATIENT)
Dept: FAMILY MEDICINE | Facility: CLINIC | Age: 37
End: 2022-10-12
Payer: COMMERCIAL

## 2022-10-12 VITALS
WEIGHT: 258.6 LBS | SYSTOLIC BLOOD PRESSURE: 122 MMHG | BODY MASS INDEX: 33.19 KG/M2 | TEMPERATURE: 96.6 F | HEART RATE: 56 BPM | DIASTOLIC BLOOD PRESSURE: 72 MMHG | HEIGHT: 74 IN | OXYGEN SATURATION: 95 %

## 2022-10-12 DIAGNOSIS — D68.61 ANTIPHOSPHOLIPID SYNDROME (H): Primary | ICD-10-CM

## 2022-10-12 LAB — D DIMER PPP FEU-MCNC: <0.27 UG/ML FEU (ref 0–0.5)

## 2022-10-12 PROCEDURE — 85390 FIBRINOLYSINS SCREEN I&R: CPT | Performed by: PATHOLOGY

## 2022-10-12 PROCEDURE — 85613 RUSSELL VIPER VENOM DILUTED: CPT | Performed by: INTERNAL MEDICINE

## 2022-10-12 PROCEDURE — 85730 THROMBOPLASTIN TIME PARTIAL: CPT | Performed by: INTERNAL MEDICINE

## 2022-10-12 PROCEDURE — 99213 OFFICE O/P EST LOW 20 MIN: CPT | Performed by: INTERNAL MEDICINE

## 2022-10-12 PROCEDURE — 86147 CARDIOLIPIN ANTIBODY EA IG: CPT | Performed by: INTERNAL MEDICINE

## 2022-10-12 PROCEDURE — 86146 BETA-2 GLYCOPROTEIN ANTIBODY: CPT | Performed by: INTERNAL MEDICINE

## 2022-10-12 PROCEDURE — 85379 FIBRIN DEGRADATION QUANT: CPT | Performed by: INTERNAL MEDICINE

## 2022-10-12 PROCEDURE — 36415 COLL VENOUS BLD VENIPUNCTURE: CPT | Performed by: INTERNAL MEDICINE

## 2022-10-12 NOTE — PROGRESS NOTES
"BRAD Patel is a 36 year old male, presenting for the following health issues:    Antiphospholipid syndrome follow-up    -daily headaches, right-sided, that improved with change of prescription glasses  -history of cervical neck pain  -denies any pain of either lower extremity  -rare episodes of pleuritic pain  -denies any palpitations  -no anticoagulation since 2019  -tests last 7/23/2020 were negative      REVIEW OF SYSTEMS   CONSTITUTIONAL: NEGATIVE for fever, chills, change in weight  INTEGUMENTARY/SKIN: NEGATIVE for worrisome rashes, moles or lesions  EYES: NEGATIVE for vision changes or irritation  ENT/MOUTH: NEGATIVE for ear, mouth and throat problems  RESP: NEGATIVE for significant cough or SOB  CV: NEGATIVE for chest pain, palpitations or peripheral edema  GI: POSITIVE for heartburn or reflux and NEGATIVE for hematemesis, hematochezia, jaundice and melena  : NEGATIVE for frequency, dysuria, or hematuria  MUSCULOSKELETAL:POSITIVE  for neck pain  NEURO: NEGATIVE for weakness, dizziness or paresthesias  ENDOCRINE: NEGATIVE for temperature intolerance, skin/hair changes  HEME: NEGATIVE for bleeding problems  PSYCHIATRIC: NEGATIVE for changes in mood or affect      OBJECTIVE   /72 (BP Location: Left arm, Patient Position: Sitting, Cuff Size: Adult Large)   Pulse 56   Temp (!) 96.6  F (35.9  C) (Tympanic)   Ht 1.88 m (6' 2\")   Wt 117.3 kg (258 lb 9.6 oz)   SpO2 95%   BMI 33.20 kg/m    Physical Exam   GENERAL: healthy, alert and no distress  EYES: Eyes grossly normal to inspection and conjunctivae and sclerae normal  HENT: normal cephalic/atraumatic  RESP: lungs clear to auscultation - no rales, rhonchi or wheezes  CV: regular rates and rhythm and no peripheral edema  MS: no gross musculoskeletal defects noted, no edema  NEURO: Normal strength and tone, mentation intact and speech normal  PSYCH: mentation appears normal, affect normal/bright      ASSESSMENT/PLAN  Antiphospholipid syndrome " (H)  - Beta 2 Glycoprotein Antibodies IGG IGM  - Cardiolipin Zulma IgG and IgM  - Lupus Anticoagulant Panel  - D dimer, quantitative; Standing  - D dimer, quantitative    Disposition:  Follow-up in one year.    Keron Johnson MD  Internal Medicine

## 2022-10-13 LAB
B2 GLYCOPROT1 IGG SERPL IA-ACNC: 1.4 U/ML
B2 GLYCOPROT1 IGM SERPL IA-ACNC: <2.4 U/ML
CARDIOLIPIN IGG SER IA-ACNC: <2 GPL-U/ML
CARDIOLIPIN IGG SER IA-ACNC: NEGATIVE
CARDIOLIPIN IGM SER IA-ACNC: <2 MPL-U/ML
CARDIOLIPIN IGM SER IA-ACNC: NEGATIVE
DRVVT SCREEN RATIO: 0.86
INR PPP: 1.06 (ref 0.85–1.15)
LA PPP-IMP: NEGATIVE
LUPUS INTERPRETATION: NORMAL
PTT RATIO: 1.13
THROMBIN TIME: 17.8 SECONDS (ref 13–19)

## 2023-03-20 ENCOUNTER — OFFICE VISIT (OUTPATIENT)
Dept: FAMILY MEDICINE | Facility: CLINIC | Age: 38
End: 2023-03-20
Payer: COMMERCIAL

## 2023-03-20 ENCOUNTER — ANCILLARY PROCEDURE (OUTPATIENT)
Dept: GENERAL RADIOLOGY | Facility: CLINIC | Age: 38
End: 2023-03-20
Attending: FAMILY MEDICINE
Payer: COMMERCIAL

## 2023-03-20 VITALS
TEMPERATURE: 97.8 F | DIASTOLIC BLOOD PRESSURE: 70 MMHG | RESPIRATION RATE: 24 BRPM | HEIGHT: 74 IN | HEART RATE: 55 BPM | BODY MASS INDEX: 30.57 KG/M2 | OXYGEN SATURATION: 97 % | SYSTOLIC BLOOD PRESSURE: 129 MMHG | WEIGHT: 238.2 LBS

## 2023-03-20 DIAGNOSIS — M54.50 ACUTE RIGHT-SIDED LOW BACK PAIN WITHOUT SCIATICA: Primary | ICD-10-CM

## 2023-03-20 DIAGNOSIS — M54.50 ACUTE RIGHT-SIDED LOW BACK PAIN WITHOUT SCIATICA: ICD-10-CM

## 2023-03-20 PROCEDURE — 72100 X-RAY EXAM L-S SPINE 2/3 VWS: CPT | Mod: TC | Performed by: RADIOLOGY

## 2023-03-20 PROCEDURE — 99213 OFFICE O/P EST LOW 20 MIN: CPT | Performed by: FAMILY MEDICINE

## 2023-03-20 RX ORDER — CYCLOBENZAPRINE HCL 10 MG
10 TABLET ORAL 3 TIMES DAILY PRN
Qty: 20 TABLET | Refills: 0 | Status: SHIPPED | OUTPATIENT
Start: 2023-03-20

## 2023-03-20 ASSESSMENT — PAIN SCALES - GENERAL: PAINLEVEL: MODERATE PAIN (5)

## 2023-03-20 NOTE — PROGRESS NOTES
"  Assessment & Plan     Acute right-sided low back pain without sciatica  -H/o slipping and turning around railing 4 days ago, heard pop in right side of his lower back.  -Since then having intermittent discomfort with pain scale ranging from 7-8 out of 10.  -Certain postures make him feel better.  -Denied red flag signs, numbness in extremities/incontinence issues.  -Prefers to stay out of pain medications, has not tried any yet.    PLAN:  -X-ray, Flexeril prn, warm compresses suggested Tylenol as needed.  -Mom is a physical therapist and patient prefers to get therapy from his mom. Hence denied formal PT referral.  -Follow-up in 3 weeks to reassess.  -Warning signs explained and if he experiences any of those, he will follow-up sooner or let me know to get MRI back.    - cyclobenzaprine (FLEXERIL) 10 MG tablet; Take 1 tablet (10 mg) by mouth 3 times daily as needed for muscle spasms  - XR Lumbar Spine 2/3 Views; Future         BMI:   Estimated body mass index is 30.58 kg/m  as calculated from the following:    Height as of this encounter: 1.88 m (6' 2\").    Weight as of this encounter: 108 kg (238 lb 3.2 oz).           Return in about 3 weeks (around 4/10/2023) for Follow up back pain.    Gay Shelley MD  [facility]    David Patel is a 37 year old, presenting for the following health issues:  Back Pain      History of Present Illness       Back Pain:  He presents for follow up of back pain. Patient's back pain is a new problem.    Original cause of back pain: other  First noticed back pain: in the last week  Patient feels back pain: constantlyLocation of back pain:  Right lower back  Description of back pain: cramping, sharp, shooting and stabbing  Back pain spreads: nowhere    Since patient first noticed back pain, pain is: gradually worsening  Does back pain interfere with his job:  Yes  On a scale of 1-10 (10 being the worst), patient describes pain as:  " "6  What makes back pain worse: bending, coughing, certain positions, sitting, standing and twisting  Acupuncture: not tried  Acetaminophen: not tried  Activity or exercise: not helpful  Chiropractor:  Not tried  Cold: not tried  Heat: not helpful  Massage: not tried  Muscle relaxants: not tried  NSAIDS: not tried  Opioids: not tried  Physical Therapy: not tried  Rest: not helpful  Steroid Injection: not tried  Stretching: not helpful  Surgery: not tried  TENS unit: not tried  Topical pain relievers: not tried  Other healthcare providers patient is seeing for back pain: None    He eats 4 or more servings of fruits and vegetables daily.He consumes 0 sweetened beverage(s) daily.He exercises with enough effort to increase his heart rate 60 or more minutes per day.  He exercises with enough effort to increase his heart rate 5 days per week.   He is taking medications regularly.             Review of Systems   Constitutional, HEENT, cardiovascular, pulmonary, gi and gu systems are negative, except as otherwise noted.      Objective    /70 (BP Location: Left arm, Patient Position: Sitting, Cuff Size: Adult Large)   Pulse 55   Temp 97.8  F (36.6  C) (Oral)   Resp 24   Ht 1.88 m (6' 2\")   Wt 108 kg (238 lb 3.2 oz)   SpO2 97%   BMI 30.58 kg/m    Body mass index is 30.58 kg/m .  Physical Exam   GENERAL: healthy, alert and no distress  NECK: no adenopathy, no asymmetry, masses, or scars and thyroid normal to palpation  RESP: lungs clear to auscultation - no rales, rhonchi or wheezes  CV: regular rate and rhythm, normal S1 S2, no S3 or S4, no murmur, click or rub, no peripheral edema and peripheral pulses strong  ABDOMEN: soft, nontender, no hepatosplenomegaly, no masses and bowel sounds normal  MS: no gross musculoskeletal defects noted, no edema                    "

## 2023-03-20 NOTE — LETTER
March 20, 2023      Jorge Vuong  [address]  [facility] [address]        To Whom It May Concern:    Jorge Vuong was seen in our clinic. He may return to work with the following: limited to light duty - lifting no greater than 10 pounds for the next 3 weeks.      Sincerely,  Gay Shelley MD

## 2023-03-20 NOTE — PATIENT INSTRUCTIONS
At [facility], we strive to deliver an exceptional experience to you, every time we see you. If you receive a survey, please complete it as we do value your feedback.  If you have MyChart, you can expect to receive results automatically within 24 hours of their completion.  Your provider will send a note interpreting your results as well.   If you do not have MyChart, you should receive your results in about a week by mail.    Your care team:                            Family Medicine Internal Medicine   MD Keron Schilling MD Shantel Branch-Fleming, MD Srinivasa Vaka, MD Katya Belousova, PAJUD Fraire CNP, MD ([facility]) Pediatrics   Jaya Henriquez, MD Katelynn Bernard MD Amelia Massimini APRN ROJAS Gallardo APRN MD Gay Booker MD          Clinic hours: Monday - Thursday 7 am-6 pm; Fridays 7 am-5 pm.   Urgent care: Monday - Friday 10 am- 8 pm; Saturday and Sunday 9 am-5 pm.    Clinic: [phone]       [facility]: Monday - Thursday 8 am - 7 pm; Friday 8 am - 6 pm  [facility]: [phone]

## 2023-04-23 ENCOUNTER — HEALTH MAINTENANCE LETTER (OUTPATIENT)
Age: 38
End: 2023-04-23

## 2024-02-26 ENCOUNTER — OFFICE VISIT (OUTPATIENT)
Dept: URGENT CARE | Facility: URGENT CARE | Age: 39
End: 2024-02-26

## 2024-02-26 VITALS
BODY MASS INDEX: 30.9 KG/M2 | SYSTOLIC BLOOD PRESSURE: 121 MMHG | DIASTOLIC BLOOD PRESSURE: 71 MMHG | OXYGEN SATURATION: 96 % | WEIGHT: 240.7 LBS | HEART RATE: 114 BPM | RESPIRATION RATE: 28 BRPM | TEMPERATURE: 104.7 F

## 2024-02-26 DIAGNOSIS — R00.0 TACHYCARDIA: ICD-10-CM

## 2024-02-26 DIAGNOSIS — R10.31 RLQ ABDOMINAL PAIN: ICD-10-CM

## 2024-02-26 DIAGNOSIS — R50.9 FEVER, UNSPECIFIED FEVER CAUSE: Primary | ICD-10-CM

## 2024-02-26 DIAGNOSIS — E86.0 DEHYDRATION: ICD-10-CM

## 2024-02-26 DIAGNOSIS — R06.02 SHORTNESS OF BREATH: ICD-10-CM

## 2024-02-26 PROCEDURE — 99215 OFFICE O/P EST HI 40 MIN: CPT

## 2024-02-26 NOTE — PROGRESS NOTES
ASSESSMENT:  (R50.9) Fever, unspecified fever cause  (primary encounter diagnosis)    (R06.02) Shortness of breath    (R00.0) Tachycardia    (E86.0) Dehydration    (R10.31) RLQ abdominal pain    PLAN:  Due to the patient's worsening symptoms, decreased urinary output, persistently high fever, shortness of breath, right lower quadrant abdominal pain, bilateral CVA tenderness, tachycardia and likelihood of his dehydration due to the low urinary output despite drinking about 3 quarts of a gallon of water per day with cranberry juice and Pedialyte; the patient was advised to proceed to an emergency department for further evaluation and treatment.  The patient indicates that his mom drove him to the clinic today and would drive him to the emergency department.  We did discuss that he is stable for transportation via private car and would not need an ambulance at the present time during my assessment.  I considered an acute and diagnostic services clinic referral but felt the patient needed a more comprehensive evaluation provided in the emergency department and due to the fact that the patient indicated he is self-pay and currently does not have any insurance.  Patient acknowledged his understanding of the above plan and indicated he would proceed to an emergency department for further evaluation and treatment.    The use of Dragon/Volunia dictation services may have been used to construct the content in this note; any grammatical or spelling errors are non-intentional. Please contact the author of this note directly if you are in need of any clarification.      JUD Benítez CNP      SUBJECTIVE:   Jorge Vuong is a 38 year old male presenting with a chief complaint of fever, chills, cough - non-productive, fatigue, ear pain, and headache.  Onset of symptoms was 6 day(s) ago.  More recently the patient reports dark yellow urine with low output despite drinking about 3/4 of a gallon water per day with  cranberry juice and Pedialyte  Course of illness is worsening.    Patient denies: sore throat, nausea, vomiting, and diarrhea  Treatment measures tried include Tylenol.  Predisposing factors include None.    ROS:  Negative except noted above.    OBJECTIVE:  /71 (BP Location: Left arm, Patient Position: Sitting, Cuff Size: Adult Large)   Pulse 114   Temp (!) 104.7  F (40.4  C) (Tympanic)   Resp 28   Wt 109.2 kg (240 lb 11.2 oz)   SpO2 96%   BMI 30.90 kg/m    GENERAL APPEARANCE: healthy, alert and no distress  EYES: EOMI,  PERRL, conjunctiva clear  HENT: TM erythematous left, TM congested/bulging left, TM fluid left, and dry oral mucous membranes  NECK: supple, nontender, no lymphadenopathy  RESP: lungs clear to auscultation - no rales, rhonchi or wheezes  CV: normal S1 S2, no murmur noted.  Tachycardia   ABDOMEN: soft, normal bowel sounds, tenderness moderate RLQ  BACK: bilateral CVA tenderness  SKIN: no suspicious lesions or rashes

## 2024-02-26 NOTE — PATIENT INSTRUCTIONS
Given you constellation of worsening symptoms, your shortness of breath, decreased urinary output, high fever, right lower quadrant abdominal pain and tachycardia; it is advised you proceed to an emergency department for further evaluation and treatment.

## 2024-06-29 ENCOUNTER — HEALTH MAINTENANCE LETTER (OUTPATIENT)
Age: 39
End: 2024-06-29

## 2025-07-13 ENCOUNTER — HEALTH MAINTENANCE LETTER (OUTPATIENT)
Age: 40
End: 2025-07-13